# Patient Record
Sex: FEMALE | Race: BLACK OR AFRICAN AMERICAN | Employment: PART TIME | URBAN - METROPOLITAN AREA
[De-identification: names, ages, dates, MRNs, and addresses within clinical notes are randomized per-mention and may not be internally consistent; named-entity substitution may affect disease eponyms.]

---

## 2017-11-30 ENCOUNTER — HOSPITAL ENCOUNTER (EMERGENCY)
Age: 42
Discharge: HOME | End: 2017-11-30
Attending: STUDENT IN AN ORGANIZED HEALTH CARE EDUCATION/TRAINING PROGRAM
Payer: SELF-PAY

## 2017-11-30 VITALS
RESPIRATION RATE: 16 BRPM | HEART RATE: 86 BPM | DIASTOLIC BLOOD PRESSURE: 68 MMHG | HEIGHT: 65 IN | SYSTOLIC BLOOD PRESSURE: 103 MMHG | OXYGEN SATURATION: 95 % | TEMPERATURE: 98.1 F | WEIGHT: 208.38 LBS | BODY MASS INDEX: 34.72 KG/M2
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
## 2017-11-30 PROCEDURE — 99282 EMERGENCY DEPT VISIT SF MDM: CPT
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
## 2018-12-27 ENCOUNTER — HOSPITAL ENCOUNTER (EMERGENCY)
Age: 43
Discharge: LEFT BEFORE DISCHARGE | End: 2018-12-27
Attending: EMERGENCY MEDICINE
Payer: COMMERCIAL

## 2018-12-27 VITALS — HEART RATE: 111 BPM | OXYGEN SATURATION: 98 %

## 2018-12-27 PROCEDURE — 75810000275 HC EMERGENCY DEPT VISIT NO LEVEL OF CARE

## 2019-04-23 ENCOUNTER — APPOINTMENT (OUTPATIENT)
Dept: CT IMAGING | Age: 44
End: 2019-04-23
Attending: EMERGENCY MEDICINE
Payer: COMMERCIAL

## 2019-04-23 ENCOUNTER — HOSPITAL ENCOUNTER (EMERGENCY)
Age: 44
Discharge: HOME | End: 2019-04-23
Attending: EMERGENCY MEDICINE
Payer: COMMERCIAL

## 2019-04-23 VITALS
SYSTOLIC BLOOD PRESSURE: 105 MMHG | TEMPERATURE: 98.8 F | BODY MASS INDEX: 34.68 KG/M2 | HEART RATE: 77 BPM | DIASTOLIC BLOOD PRESSURE: 70 MMHG | HEIGHT: 65 IN | OXYGEN SATURATION: 98 % | RESPIRATION RATE: 15 BRPM

## 2019-04-23 DIAGNOSIS — R10.11 RUQ ABDOMINAL PAIN: Primary | ICD-10-CM

## 2019-04-23 DIAGNOSIS — D64.9 ANEMIA, UNSPECIFIED TYPE: ICD-10-CM

## 2019-04-23 LAB
ALBUMIN SERPL-MCNC: 3.4 G/DL (ref 3.5–5)
ALBUMIN/GLOB SERPL: 0.7 {RATIO} (ref 1.1–2.2)
ALP SERPL-CCNC: 95 U/L (ref 45–117)
ALT SERPL-CCNC: 13 U/L (ref 12–78)
ANION GAP SERPL CALC-SCNC: 4 MMOL/L (ref 5–15)
APPEARANCE UR: CLEAR
AST SERPL-CCNC: 15 U/L (ref 15–37)
BACTERIA URNS QL MICRO: NEGATIVE /HPF
BASOPHILS # BLD: 0 K/UL (ref 0–0.1)
BASOPHILS NFR BLD: 1 % (ref 0–1)
BILIRUB SERPL-MCNC: 0.2 MG/DL (ref 0.2–1)
BILIRUB UR QL: NEGATIVE
BUN SERPL-MCNC: 11 MG/DL (ref 6–20)
BUN/CREAT SERPL: 17 (ref 12–20)
CALCIUM SERPL-MCNC: 9 MG/DL (ref 8.5–10.1)
CHLORIDE SERPL-SCNC: 111 MMOL/L (ref 97–108)
CO2 SERPL-SCNC: 25 MMOL/L (ref 21–32)
COLOR UR: ABNORMAL
COMMENT, HOLDF: NORMAL
CREAT SERPL-MCNC: 0.63 MG/DL (ref 0.55–1.02)
DIFFERENTIAL METHOD BLD: ABNORMAL
EOSINOPHIL # BLD: 0 K/UL (ref 0–0.4)
EOSINOPHIL NFR BLD: 0 % (ref 0–7)
EPITH CASTS URNS QL MICRO: ABNORMAL /LPF
ERYTHROCYTE [DISTWIDTH] IN BLOOD BY AUTOMATED COUNT: 16.1 % (ref 11.5–14.5)
GLOBULIN SER CALC-MCNC: 4.6 G/DL (ref 2–4)
GLUCOSE SERPL-MCNC: 86 MG/DL (ref 65–100)
GLUCOSE UR STRIP.AUTO-MCNC: NEGATIVE MG/DL
HCG UR QL: NEGATIVE
HCT VFR BLD AUTO: 34.7 % (ref 35–47)
HEMOCCULT STL QL: NEGATIVE
HGB BLD-MCNC: 10.5 G/DL (ref 11.5–16)
HGB UR QL STRIP: ABNORMAL
HYALINE CASTS URNS QL MICRO: ABNORMAL /LPF (ref 0–5)
IMM GRANULOCYTES # BLD AUTO: 0 K/UL (ref 0–0.04)
IMM GRANULOCYTES NFR BLD AUTO: 0 % (ref 0–0.5)
KETONES UR QL STRIP.AUTO: NEGATIVE MG/DL
LEUKOCYTE ESTERASE UR QL STRIP.AUTO: NEGATIVE
LIPASE SERPL-CCNC: 109 U/L (ref 73–393)
LYMPHOCYTES # BLD: 1.7 K/UL (ref 0.8–3.5)
LYMPHOCYTES NFR BLD: 33 % (ref 12–49)
MCH RBC QN AUTO: 27.6 PG (ref 26–34)
MCHC RBC AUTO-ENTMCNC: 30.3 G/DL (ref 30–36.5)
MCV RBC AUTO: 91.1 FL (ref 80–99)
MONOCYTES # BLD: 0.4 K/UL (ref 0–1)
MONOCYTES NFR BLD: 8 % (ref 5–13)
NEUTS SEG # BLD: 3 K/UL (ref 1.8–8)
NEUTS SEG NFR BLD: 58 % (ref 32–75)
NITRITE UR QL STRIP.AUTO: NEGATIVE
NRBC # BLD: 0 K/UL (ref 0–0.01)
NRBC BLD-RTO: 0 PER 100 WBC
PH UR STRIP: 6 [PH] (ref 5–8)
PLATELET # BLD AUTO: 292 K/UL (ref 150–400)
PMV BLD AUTO: 10 FL (ref 8.9–12.9)
POTASSIUM SERPL-SCNC: 3.6 MMOL/L (ref 3.5–5.1)
PROT SERPL-MCNC: 8 G/DL (ref 6.4–8.2)
PROT UR STRIP-MCNC: NEGATIVE MG/DL
RBC # BLD AUTO: 3.81 M/UL (ref 3.8–5.2)
RBC #/AREA URNS HPF: ABNORMAL /HPF (ref 0–5)
SAMPLES BEING HELD,HOLD: NORMAL
SODIUM SERPL-SCNC: 140 MMOL/L (ref 136–145)
SP GR UR REFRACTOMETRY: 1.02 (ref 1–1.03)
UR CULT HOLD, URHOLD: NORMAL
UROBILINOGEN UR QL STRIP.AUTO: 1 EU/DL (ref 0.2–1)
WBC # BLD AUTO: 5.1 K/UL (ref 3.6–11)
WBC URNS QL MICRO: ABNORMAL /HPF (ref 0–4)

## 2019-04-23 PROCEDURE — 83690 ASSAY OF LIPASE: CPT

## 2019-04-23 PROCEDURE — 81001 URINALYSIS AUTO W/SCOPE: CPT

## 2019-04-23 PROCEDURE — 82272 OCCULT BLD FECES 1-3 TESTS: CPT

## 2019-04-23 PROCEDURE — 74011636320 HC RX REV CODE- 636/320: Performed by: RADIOLOGY

## 2019-04-23 PROCEDURE — 85025 COMPLETE CBC W/AUTO DIFF WBC: CPT

## 2019-04-23 PROCEDURE — 80053 COMPREHEN METABOLIC PANEL: CPT

## 2019-04-23 PROCEDURE — 74177 CT ABD & PELVIS W/CONTRAST: CPT

## 2019-04-23 PROCEDURE — 99283 EMERGENCY DEPT VISIT LOW MDM: CPT

## 2019-04-23 PROCEDURE — 74011000258 HC RX REV CODE- 258: Performed by: RADIOLOGY

## 2019-04-23 PROCEDURE — 81025 URINE PREGNANCY TEST: CPT

## 2019-04-23 PROCEDURE — 36415 COLL VENOUS BLD VENIPUNCTURE: CPT

## 2019-04-23 RX ORDER — SODIUM CHLORIDE 0.9 % (FLUSH) 0.9 %
10 SYRINGE (ML) INJECTION
Status: DISCONTINUED | OUTPATIENT
Start: 2019-04-23 | End: 2019-04-23 | Stop reason: HOSPADM

## 2019-04-23 RX ADMIN — SODIUM CHLORIDE 200 ML: 900 INJECTION, SOLUTION INTRAVENOUS at 14:38

## 2019-04-23 RX ADMIN — IOPAMIDOL 100 ML: 755 INJECTION, SOLUTION INTRAVENOUS at 14:38

## 2019-04-23 NOTE — ED TRIAGE NOTES
Pt to ED for dark tarry stool and umbilical abdominal pain that began approx 2 months ago, worsening and remaining consistent yesterday. +nausea. Hx of umbilical hernia. Tender to plapation, more on right side.

## 2019-04-23 NOTE — DISCHARGE INSTRUCTIONS
Patient Education      may take tylenol or ibuprofen for pain. Take the ibuprofen with food if you take it. Abdominal Pain: Care Instructions  Your Care Instructions    Abdominal pain has many possible causes. Some aren't serious and get better on their own in a few days. Others need more testing and treatment. If your pain continues or gets worse, you need to be rechecked and may need more tests to find out what is wrong. You may need surgery to correct the problem. Don't ignore new symptoms, such as fever, nausea and vomiting, urination problems, pain that gets worse, and dizziness. These may be signs of a more serious problem. Your doctor may have recommended a follow-up visit in the next 8 to 12 hours. If you are not getting better, you may need more tests or treatment. The doctor has checked you carefully, but problems can develop later. If you notice any problems or new symptoms, get medical treatment right away. Follow-up care is a key part of your treatment and safety. Be sure to make and go to all appointments, and call your doctor if you are having problems. It's also a good idea to know your test results and keep a list of the medicines you take. How can you care for yourself at home? · Rest until you feel better. · To prevent dehydration, drink plenty of fluids, enough so that your urine is light yellow or clear like water. Choose water and other caffeine-free clear liquids until you feel better. If you have kidney, heart, or liver disease and have to limit fluids, talk with your doctor before you increase the amount of fluids you drink. · If your stomach is upset, eat mild foods, such as rice, dry toast or crackers, bananas, and applesauce. Try eating several small meals instead of two or three large ones. · Wait until 48 hours after all symptoms have gone away before you have spicy foods, alcohol, and drinks that contain caffeine. · Do not eat foods that are high in fat.   · Avoid anti-inflammatory medicines such as aspirin, ibuprofen (Advil, Motrin), and naproxen (Aleve). These can cause stomach upset. Talk to your doctor if you take daily aspirin for another health problem. When should you call for help? Call 911 anytime you think you may need emergency care. For example, call if:    · You passed out (lost consciousness).     · You pass maroon or very bloody stools.     · You vomit blood or what looks like coffee grounds.     · You have new, severe belly pain.    Call your doctor now or seek immediate medical care if:    · Your pain gets worse, especially if it becomes focused in one area of your belly.     · You have a new or higher fever.     · Your stools are black and look like tar, or they have streaks of blood.     · You have unexpected vaginal bleeding.     · You have symptoms of a urinary tract infection. These may include:  ? Pain when you urinate. ? Urinating more often than usual.  ? Blood in your urine.     · You are dizzy or lightheaded, or you feel like you may faint.    Watch closely for changes in your health, and be sure to contact your doctor if:    · You are not getting better after 1 day (24 hours). Where can you learn more? Go to http://esperanza-miguelina.info/. Enter R050 in the search box to learn more about \"Abdominal Pain: Care Instructions. \"  Current as of: September 23, 2018  Content Version: 11.9  © 6501-7824 [employer]. Care instructions adapted under license by [employer] (which disclaims liability or warranty for this information). If you have questions about a medical condition or this instruction, always ask your healthcare professional. [place] [address] any warranty or liability for your use of this information. Patient Education        Anemia: Care Instructions  Your Care Instructions    Anemia is a low level of red blood cells, which carry oxygen throughout your body.  Many things can cause anemia. Lack of iron is one of the most common causes. Your body needs iron to make hemoglobin, a substance in red blood cells that carries oxygen from the lungs to your body's cells. Without enough iron, the body produces fewer and smaller red blood cells. As a result, your body's cells do not get enough oxygen, and you feel tired and weak. And you may have trouble concentrating. Bleeding is the most common cause of a lack of iron. You may have heavy menstrual bleeding or bleeding caused by conditions such as ulcers, hemorrhoids, or cancer. Regular use of aspirin or other anti-inflammatory medicines (such as ibuprofen) also can cause bleeding in some people. A lack of iron in your diet also can cause anemia, especially at times when the body needs more iron, such as during pregnancy, infancy, and the teen years. Your doctor may have prescribed iron pills. It may take several months of treatment for your iron levels to return to normal. Your doctor also may suggest that you eat foods that are rich in iron, such as meat and beans. There are many other causes of anemia. It is not always due to a lack of iron. Finding the specific cause of your anemia will help your doctor find the right treatment for you. Follow-up care is a key part of your treatment and safety. Be sure to make and go to all appointments, and call your doctor if you are having problems. It's also a good idea to know your test results and keep a list of the medicines you take. How can you care for yourself at home? · Take your medicines exactly as prescribed. Call your doctor if you think you are having a problem with your medicine. · If your doctor recommends iron pills, take them as directed:  ? Try to take the pills on an empty stomach about 1 hour before or 2 hours after meals. But you may need to take iron with food to avoid an upset stomach.   ? Do not take antacids or drink milk or caffeine drinks (such as coffee, tea, or cola) at the same time or within 2 hours of the time that you take your iron. They can make it hard for your body to absorb the iron. ? Vitamin C (from food or supplements) helps your body absorb iron. Try taking iron pills with a glass of orange juice or some other food that is high in vitamin C, such as citrus fruits. ? Iron pills may cause stomach problems, such as heartburn, nausea, diarrhea, constipation, and cramps. Be sure to drink plenty of fluids, and include fruits, vegetables, and fiber in your diet each day. Iron pills often make your bowel movements dark or green. ? If you forget to take an iron pill, do not take a double dose of iron the next time you take a pill. ? Keep iron pills out of the reach of small children. An overdose of iron can be very dangerous. · Follow your doctor's advice about eating iron-rich foods. These include red meat, shellfish, poultry, eggs, beans, raisins, whole-grain bread, and leafy green vegetables. · Steam vegetables to help them keep their iron content. When should you call for help? Call 911 anytime you think you may need emergency care. For example, call if:    · You have symptoms of a heart attack. These may include:  ? Chest pain or pressure, or a strange feeling in the chest.  ? Sweating. ? Shortness of breath. ? Nausea or vomiting. ? Pain, pressure, or a strange feeling in the back, neck, jaw, or upper belly or in one or both shoulders or arms. ? Lightheadedness or sudden weakness. ? A fast or irregular heartbeat. After you call 911, the  may tell you to chew 1 adult-strength or 2 to 4 low-dose aspirin. Wait for an ambulance.  Do not try to drive yourself.     · You passed out (lost consciousness).    Call your doctor now or seek immediate medical care if:    · You have new or increased shortness of breath.     · You are dizzy or lightheaded, or you feel like you may faint.     · Your fatigue and weakness continue or get worse.     · You have any abnormal bleeding, such as:  ? Nosebleeds. ? Vaginal bleeding that is different (heavier, more frequent, at a different time of the month) than what you are used to.  ? Bloody or black stools, or rectal bleeding. ? Bloody or pink urine.    Watch closely for changes in your health, and be sure to contact your doctor if:    · You do not get better as expected. Where can you learn more? Go to http://esperanza-miguelina.info/. Enter R301 in the search box to learn more about \"Anemia: Care Instructions. \"  Current as of: May 6, 2018  Content Version: 11.9  © 2532-6887 [employer]. Care instructions adapted under license by [employer] (which disclaims liability or warranty for this information). If you have questions about a medical condition or this instruction, always ask your healthcare professional. [address] any warranty or liability for your use of this information.

## 2019-04-23 NOTE — ED PROVIDER NOTES
HPI     36 yo female with with h/o hernia repair here with abd pain periumbilical and move to right. Dark stool yesterday and today. Nausea. No vomiting or diarrhea. Last colonoscopy was 2016 - negative per pt. GI is uknown at [facility]. Surgeon - Dr. Cali Enciso. Denies cp, sob, or any other complaints. No urinary complaints. SHX:  + smoker. +etoh. Past Medical History:   Diagnosis Date    Anemia     Anemia     Anemia     Anemia     Anemia NEC     Blood transfusion     Bronchitis     Community acquired pneumonia     Gastritis     Gastrointestinal disorder     hiatal hernia, gastric ulcers    Hiatal hernia     IBS (irritable bowel syndrome)     Ill-defined condition     Anemia    Migraine     Sinus infection        Past Surgical History:   Procedure Laterality Date    HX HERNIA REPAIR  2013    Nissen fundoplication    HX HERNIA REPAIR  7/24/2014    HX HYSTERECTOMY  2012    HX OTHER SURGICAL      noroplant removed from L arm         Family History:   Problem Relation Age of Onset    Stroke Mother     Lung Disease Father     Bleeding Prob Father     Heart Disease Father     Hypertension Father     Anesth Problems Neg Hx        Social History     Socioeconomic History    Marital status: SINGLE     Spouse name: Not on file    Number of children: Not on file    Years of education: Not on file    Highest education level: Not on file   Occupational History    Not on file   Social Needs    Financial resource strain: Not on file    Food insecurity:     Worry: Not on file     Inability: Not on file    Transportation needs:     Medical: Not on file     Non-medical: Not on file   Tobacco Use    Smoking status: Current Every Day Smoker     Packs/day: 0.50     Years: 10.00     Pack years: 5.00     Types: Cigars    Smokeless tobacco: Never Used    Tobacco comment: SMOKES CIGARS   Substance and Sexual Activity    Alcohol use: Yes     Alcohol/week: 0.0 oz     Comment:  Occ    Drug use: No     Types: Prescription    Sexual activity: Never     Comment: seperated--has 3 children   Lifestyle    Physical activity:     Days per week: Not on file     Minutes per session: Not on file    Stress: Not on file   Relationships    Social connections:     Talks on phone: Not on file     Gets together: Not on file     Attends Tenriism service: Not on file     Active member of club or organization: Not on file     Attends meetings of clubs or organizations: Not on file     Relationship status: Not on file    Intimate partner violence:     Fear of current or ex partner: Not on file     Emotionally abused: Not on file     Physically abused: Not on file     Forced sexual activity: Not on file   Other Topics Concern    Not on file   Social History Narrative    Not on file         ALLERGIES: Patient has no known allergies. Review of Systems   Constitutional: Negative for chills and fever. Respiratory: Negative for shortness of breath. Cardiovascular: Negative for chest pain. Gastrointestinal: Positive for abdominal pain and blood in stool (dark stool). Negative for diarrhea and vomiting. All other systems reviewed and are negative. Vitals:    04/23/19 1118 04/23/19 1206   BP:  105/70   Pulse: 100 77   Resp:  15   Temp:  98.8 °F (37.1 °C)   SpO2: 98% 98%   Height:  5' 5\" (1.651 m)            Physical Exam   Constitutional: She is oriented to person, place, and time. She appears well-developed and well-nourished. HENT:   Head: Normocephalic and atraumatic. Mouth/Throat: Oropharynx is clear and moist. No oropharyngeal exudate. Eyes: Pupils are equal, round, and reactive to light. Conjunctivae are normal. Right eye exhibits no discharge. Left eye exhibits no discharge. No scleral icterus. Neck: Normal range of motion. Neck supple. Cardiovascular: Normal rate, regular rhythm and normal heart sounds. Exam reveals no gallop and no friction rub. No murmur heard.   Pulmonary/Chest: Effort normal and breath sounds normal. No respiratory distress. She has no wheezes. She has no rales. Abdominal: Soft. Bowel sounds are normal. She exhibits no distension. Tenderness: periumbilical and right flank. There is no guarding. Musculoskeletal: Normal range of motion. She exhibits no edema or tenderness. Lymphadenopathy:     She has no cervical adenopathy. Neurological: She is alert and oriented to person, place, and time. No cranial nerve deficit. Coordination normal.   Skin: Skin is warm and dry. No rash noted. Nursing note and vitals reviewed. MDM     abd pain. Dark stool. Check labs and ct abd pelvis. Stool for blood. Procedures      3:32 PM  Micro hematuria and h/o same - f/u pcp. HgB at baseline. Biliary normal on CT.      3:44 PM  Rectal exam completed with RN present. Dark brown stool. No mass. Sent to lab. Olivier Ellis MD    1600  Patient's results have been reviewed with them. Patient and/or family have verbally conveyed their understanding and agreement of the patient's signs, symptoms, diagnosis, treatment and prognosis and additionally agree to follow up as recommended or return to the Emergency Room should their condition change prior to follow-up. Discharge instructions have also been provided to the patient with some educational information regarding their diagnosis as well a list of reasons why they would want to return to the ER prior to their follow-up appointment should their condition change.          Recent Results (from the past 24 hour(s))   CBC WITH AUTOMATED DIFF    Collection Time: 04/23/19 11:47 AM   Result Value Ref Range    WBC 5.1 3.6 - 11.0 K/uL    RBC 3.81 3.80 - 5.20 M/uL    HGB 10.5 (L) 11.5 - 16.0 g/dL    HCT 34.7 (L) 35.0 - 47.0 %    MCV 91.1 80.0 - 99.0 FL    MCH 27.6 26.0 - 34.0 PG    MCHC 30.3 30.0 - 36.5 g/dL    RDW 16.1 (H) 11.5 - 14.5 %    PLATELET 800 324 - 461 K/uL    MPV 10.0 8.9 - 12.9 FL    NRBC 0.0 0  WBC    ABSOLUTE NRBC 0.00 0.00 - 0.01 K/uL    NEUTROPHILS 58 32 - 75 %    LYMPHOCYTES 33 12 - 49 %    MONOCYTES 8 5 - 13 %    EOSINOPHILS 0 0 - 7 %    BASOPHILS 1 0 - 1 %    IMMATURE GRANULOCYTES 0 0.0 - 0.5 %    ABS. NEUTROPHILS 3.0 1.8 - 8.0 K/UL    ABS. LYMPHOCYTES 1.7 0.8 - 3.5 K/UL    ABS. MONOCYTES 0.4 0.0 - 1.0 K/UL    ABS. EOSINOPHILS 0.0 0.0 - 0.4 K/UL    ABS. BASOPHILS 0.0 0.0 - 0.1 K/UL    ABS. IMM. GRANS. 0.0 0.00 - 0.04 K/UL    DF AUTOMATED     METABOLIC PANEL, COMPREHENSIVE    Collection Time: 04/23/19 11:47 AM   Result Value Ref Range    Sodium 140 136 - 145 mmol/L    Potassium 3.6 3.5 - 5.1 mmol/L    Chloride 111 (H) 97 - 108 mmol/L    CO2 25 21 - 32 mmol/L    Anion gap 4 (L) 5 - 15 mmol/L    Glucose 86 65 - 100 mg/dL    BUN 11 6 - 20 MG/DL    Creatinine 0.63 0.55 - 1.02 MG/DL    BUN/Creatinine ratio 17 12 - 20      GFR est AA >60 >60 ml/min/1.73m2    GFR est non-AA >60 >60 ml/min/1.73m2    Calcium 9.0 8.5 - 10.1 MG/DL    Bilirubin, total 0.2 0.2 - 1.0 MG/DL    ALT (SGPT) 13 12 - 78 U/L    AST (SGOT) 15 15 - 37 U/L    Alk.  phosphatase 95 45 - 117 U/L    Protein, total 8.0 6.4 - 8.2 g/dL    Albumin 3.4 (L) 3.5 - 5.0 g/dL    Globulin 4.6 (H) 2.0 - 4.0 g/dL    A-G Ratio 0.7 (L) 1.1 - 2.2     URINALYSIS W/MICROSCOPIC    Collection Time: 04/23/19 11:47 AM   Result Value Ref Range    Color YELLOW/STRAW      Appearance CLEAR CLEAR      Specific gravity 1.022 1.003 - 1.030      pH (UA) 6.0 5.0 - 8.0      Protein NEGATIVE  NEG mg/dL    Glucose NEGATIVE  NEG mg/dL    Ketone NEGATIVE  NEG mg/dL    Bilirubin NEGATIVE  NEG      Blood MODERATE (A) NEG      Urobilinogen 1.0 0.2 - 1.0 EU/dL    Nitrites NEGATIVE  NEG      Leukocyte Esterase NEGATIVE  NEG      WBC 0-4 0 - 4 /hpf    RBC 10-20 0 - 5 /hpf    Epithelial cells FEW FEW /lpf    Bacteria NEGATIVE  NEG /hpf    Hyaline cast 0-2 0 - 5 /lpf   URINE CULTURE HOLD SAMPLE    Collection Time: 04/23/19 11:47 AM   Result Value Ref Range    Urine culture hold        URINE ON HOLD IN MICROBIOLOGY DEPT FOR 3 DAYS. IF UNPRESERVED URINE IS SUBMITTED, IT CANNOT BE USED FOR ADDITIONAL TESTING AFTER 24 HRS, RECOLLECTION WILL BE REQUIRED. SAMPLES BEING HELD    Collection Time: 04/23/19 11:47 AM   Result Value Ref Range    SAMPLES BEING HELD 1RED 1BLU     COMMENT        Add-on orders for these samples will be processed based on acceptable specimen integrity and analyte stability, which may vary by analyte. HCG URINE, QL. - POC    Collection Time: 04/23/19 11:47 AM   Result Value Ref Range    Pregnancy test,urine (POC) NEGATIVE  NEG     LIPASE    Collection Time: 04/23/19 11:47 AM   Result Value Ref Range    Lipase 109 73 - 393 U/L   OCCULT BLOOD, STOOL    Collection Time: 04/23/19  3:44 PM   Result Value Ref Range    Occult blood, stool NEGATIVE  NEG         Ct Abd Pelv W Cont    Result Date: 4/23/2019  EXAM: CT ABD PELV W CONT INDICATION: dark stool. right sided and periumbilical abd pain for 2 months . History of umbilical hernia. COMPARISON: 12/2/2015 CONTRAST: 100 mL of Isovue-370. TECHNIQUE: Following the uneventful intravenous administration of contrast, thin axial images were obtained through the abdomen and pelvis. Coronal and sagittal reconstructions were generated. Oral contrast was not administered. CT dose reduction was achieved through use of a standardized protocol tailored for this examination and automatic exposure control for dose modulation. FINDINGS: LUNG BASES: Bibasilar atelectasis. INCIDENTALLY IMAGED HEART AND MEDIASTINUM: Unremarkable. LIVER: No mass or biliary dilatation. GALLBLADDER: Unremarkable. SPLEEN: No mass. PANCREAS: No mass or ductal dilatation. ADRENALS: Unremarkable. KIDNEYS: No mass, calculus, or hydronephrosis. STOMACH: Surgical clips at the GE junction consistent with the clinical history of Nissen fundoplication.  The proximal stomach is thick-walled and contains surgical clips, with a large amount of intraluminal debris, similar to the prior examination, and probable gastrojejunostomy. SMALL BOWEL: No dilatation or wall thickening. COLON: No dilatation or wall thickening. APPENDIX: Unremarkable. PERITONEUM: No ascites or pneumoperitoneum. RETROPERITONEUM: No lymphadenopathy or aortic aneurysm. REPRODUCTIVE ORGANS: Surgically absent uterus. URINARY BLADDER: No mass or calculus. BONES: No destructive bone lesion. ADDITIONAL COMMENTS: Periumbilical hernia containing fat, just to the right of midline, relatively stable. IMPRESSION: 1. No acute abdominal or pelvic abnormality. 2. Postoperative changes with mild gastric wall thickening. Correlate with surgical and clinical history. There is no obvious hemorrhage or mass. 3. Periumbilical hernia,, stable. 4. Other incidental and postoperative changes as described.

## 2022-05-02 PROBLEM — M75.41 IMPINGEMENT SYNDROME OF SHOULDER, RIGHT: Status: ACTIVE | Noted: 2022-05-02

## 2022-05-02 NOTE — PROGRESS NOTES
ASSESSMENT/PLAN:  Below is the assessment and plan developed based on review of pertinent history, physical exam, labs, studies, and medications. 1. Impingement syndrome of shoulder, right  -     diclofenac sodium 1.5 % drop; 40 Drops by Apply Externally route four (4) times daily as needed for PRN Reason (Other). , Normal, Disp-150 mL, R-0  -     lidocaine (LIDODERM) 5 %; Apply patch to the affected area for 12 hours a day and remove for 12 hours a day., Normal, Disp-30 Each, R-2  -     lidocaine (XYLOCAINE) 5 % ointment; Apply to affected area twice daily as needed for pain, Normal, Disp-100 g, R-2      Return in about 3 months (around 8/3/2022). In discussion with the patient, we considered the numerus possible diagnoses that could be contributing to their present symptoms. We also deliberated on the extensive management options that must be considered to treat their current condition. We reviewed their accessible prior medical records, diagnostic tests, and current health and employment information. We considered how these symptoms were affecting the patient´s activities of daily living as well as employment and fitness activities. The patient had various questions regarding the possible risks, benefits, complications, morbidity and mortality regarding their diagnosis and treatment options. The patients´ comorbidities were considered, and I advocated that they consider maximizing lifestyle modification through nutrition and exercise to aid in addressing their symptoms. Shared decision making yielded an understanding to move forward with conservation treatment preferences. The patient expressed understanding that if conservative management fails to alleviate the present symptoms they will return to office for re-evaluation and consideration of additional diagnostic tests and potential surgical options.      In the interim, we have recommended ice, elevation, and take prescription anti-inflammatory medications along with a physician directed home exercise program. We discussed the risks and common side effects of anti-inflammatory medications and instructed the patient to discontinue the medication and contact us if they experienced any side effects. The patient was encouraged to discuss the possible side effects with their family physician or pharmacist prior to initiating any new medications. We discussed the fact that many of the recommended treatment options presented are significantly limited by the patient´s social determinants of health. We also reviewed the circumstances surrounding the environment that they live and work which affect a wide range of health risk. We considered the limited access to appropriate educational resources regarding proper nutrition and exercise as well as the economic and social support necessary to maintain health and wellbeing. After a long discussion regarding treatment options, we have decided to prescribe an oral medication. We discussed the risks and common side effects of the medication and instructed the patient to discontinue the medication and contact us if they experienced any side effects. We also encouraged the patient to discuss the possible side effects with their family physician or pharmacist prior to initiating any new medications. Will continue on home exercise program.  Should be out of work at this time. I am happy to see her back in 6 to 8 weeks to evaluate her progress. We talked about the fact that she may want to see our hand specialist for index finger. We will continue her on diclofenac cream, lidocaine cream as well as lidocaine patches to help. She will be following up soon with a hernia specialist.    SUBJECTIVE/OBJECTIVE:  Rafael Vera (: 1975) is a 48 y.o. female, patient,here for evaluation of the Shoulder Pain (right shoulder)  . Of note, the patient was last seen for right shoulder on 2021.   She originally injured her shoulder at work. She underwent extensive physical therapy as well as injections and anti-inflammatory medications. She had an MRI 4/6/2021 that did show some bursal fraying of the rotator cuff but not a full-thickness tear. At her last visit, we talked about the possibility of an arthroscopy with bursectomy, acromioplasty and distal clavicle excision. She follows up today. Reports she has had an extensive history with recent hernia surgery that required multiple hospitalizations. She reports that she still having discomfort with her shoulder. She reports she recently had problems with her index finger and compensating for her right shoulder. Physical Exam    Upon physical examination, the patient is well developed, well nourished, alert and oriented times three, with normal mood and affect and walks with a normal gait. Upon examination of the right shoulder, the patient sits with the scapula protracted and depressed. They are tender to palpation over the anterior supraspinatus and proximal biceps. They also are tender to palpation over the acromioclavicular joint and have discomfort with cross adduction testing. There is mild palpable crepitus in the subacromial space with ranging. The patient has limited range of motion, and discomfort with above shoulder range of motion. The patient has discomfort with Neer and Elder impingement maneuvers and Whipple testing. The shoulder is stable on exam. They have 5/5 strength with internal and external rotation and are neurovascularly intact distally. There is no erythema, warmth or skin lesions present. On examination of the contralateral extremity, the patient is nontender to palpation and has excellent range of motion, stability and strength. Imaging:    I have reviewed the patient´s previous diagnostic tests in an effort to support the diagnosis and treatment options.     Allergies   Allergen Reactions    Gabapentin Unknown (comments) Current Outpatient Medications   Medication Sig    Pain Reliever Extra Strength 500 mg tablet     ALPRAZolam (XANAX) 0.25 mg tablet     escitalopram oxalate (LEXAPRO) 10 mg tablet Take 10 mg by mouth daily.  diclofenac sodium 1.5 % drop 40 Drops by Apply Externally route four (4) times daily as needed for PRN Reason (Other).  lidocaine (LIDODERM) 5 % Apply patch to the affected area for 12 hours a day and remove for 12 hours a day.  lidocaine (XYLOCAINE) 5 % ointment Apply to affected area twice daily as needed for pain     No current facility-administered medications for this visit.        Past Medical History:   Diagnosis Date    Anemia     Anemia     Anemia     Anemia     Anemia NEC     Blood transfusion     Bronchitis     Community acquired pneumonia     Gastritis     Gastrointestinal disorder     hiatal hernia, gastric ulcers    Hiatal hernia     IBS (irritable bowel syndrome)     Ill-defined condition     Anemia    Migraine     Sinus infection        Past Surgical History:   Procedure Laterality Date    HX HERNIA REPAIR  2013    Nissen fundoplication    HX HERNIA REPAIR  7/24/2014    HX HYSTERECTOMY  2012    HX KNEE ARTHROSCOPY      HX OTHER SURGICAL      noroplant removed from L arm       Family History   Problem Relation Age of Onset    Stroke Mother     Lung Disease Father     Bleeding Prob Father     Heart Disease Father     Hypertension Father     Anesth Problems Neg Hx        Social History     Socioeconomic History    Marital status: SINGLE     Spouse name: Not on file    Number of children: Not on file    Years of education: Not on file    Highest education level: Not on file   Occupational History    Not on file   Tobacco Use    Smoking status: Former Smoker     Packs/day: 0.50     Years: 10.00     Pack years: 5.00     Types: Cigars    Smokeless tobacco: Never Used    Tobacco comment: SMOKES CIGARS   Vaping Use    Vaping Use: Some days    Substances: Flavoring   Substance and Sexual Activity    Alcohol use: Yes     Alcohol/week: 0.0 standard drinks     Comment: Occ    Drug use: No     Types: Prescription    Sexual activity: Never     Comment: seperated--has 3 children   Other Topics Concern    Not on file   Social History Narrative    Not on file     Social Determinants of Health     Financial Resource Strain:     Difficulty of Paying Living Expenses: Not on file   Food Insecurity:     Worried About Running Out of Food in the Last Year: Not on file    Theresa of Food in the Last Year: Not on file   Transportation Needs:     Lack of Transportation (Medical): Not on file    Lack of Transportation (Non-Medical): Not on file   Physical Activity:     Days of Exercise per Week: Not on file    Minutes of Exercise per Session: Not on file   Stress:     Feeling of Stress : Not on file   Social Connections:     Frequency of Communication with Friends and Family: Not on file    Frequency of Social Gatherings with Friends and Family: Not on file    Attends Cheondoism Services: Not on file    Active Member of [address] [place] [address] or Organizations: Not on file    Attends Club or Organization Meetings: Not on file    Marital Status: Not on file   Intimate Partner Violence:     Fear of Current or Ex-Partner: Not on file    Emotionally Abused: Not on file    Physically Abused: Not on file    Sexually Abused: Not on file   Housing Stability:     Unable to Pay for Housing in the Last Year: Not on file    Number of [place] in the Last Year: Not on file    Unstable Housing in the Last Year: Not on file       Review of Systems    No flowsheet data found. Vitals:  Ht 5' 5\" (1.651 m)   Wt 190 lb (86.2 kg)   LMP 01/04/2012   BMI 31.62 kg/m²    Body mass index is 31.62 kg/m². An electronic signature was used to authenticate this note.   -- Michelle Deal MD

## 2022-05-03 ENCOUNTER — OFFICE VISIT (OUTPATIENT)
Dept: ORTHOPEDIC SURGERY | Age: 47
End: 2022-05-03
Payer: OTHER MISCELLANEOUS

## 2022-05-03 VITALS — HEIGHT: 65 IN | BODY MASS INDEX: 31.65 KG/M2 | WEIGHT: 190 LBS

## 2022-05-03 DIAGNOSIS — M75.41 IMPINGEMENT SYNDROME OF SHOULDER, RIGHT: Primary | ICD-10-CM

## 2022-05-03 PROCEDURE — 99214 OFFICE O/P EST MOD 30 MIN: CPT | Performed by: ORTHOPAEDIC SURGERY

## 2022-05-03 RX ORDER — BENZOIN
TINCTURE TOPICAL
COMMUNITY
Start: 2022-03-05 | End: 2022-09-25 | Stop reason: ALTCHOICE

## 2022-05-03 RX ORDER — ALPRAZOLAM 0.25 MG/1
TABLET ORAL
COMMUNITY
Start: 2022-03-09 | End: 2022-09-27

## 2022-05-03 RX ORDER — DICLOFENAC SODIUM 16.05 MG/ML
40 SOLUTION TOPICAL
Qty: 150 ML | Refills: 0 | Status: SHIPPED | OUTPATIENT
Start: 2022-05-03 | End: 2022-09-27

## 2022-05-03 RX ORDER — ESCITALOPRAM OXALATE 10 MG/1
10 TABLET ORAL DAILY
COMMUNITY
Start: 2022-04-07

## 2022-05-03 RX ORDER — LIDOCAINE 50 MG/G
OINTMENT TOPICAL
Qty: 100 G | Refills: 2 | Status: SHIPPED | OUTPATIENT
Start: 2022-05-03

## 2022-05-03 RX ORDER — LIDOCAINE 50 MG/G
PATCH TOPICAL
Qty: 30 EACH | Refills: 2 | Status: SHIPPED | OUTPATIENT
Start: 2022-05-03

## 2022-05-03 NOTE — LETTER
NOTIFICATION RETURN TO WORK / SCHOOL    5/3/2022 11:38 AM    Ms. Lamar Damon  [address] [facility] [address] [address]      To Whom It May Concern:    Lamar Damon is currently under the care of [facility]. She will be out of work at this point for her right shoulder. I am happy to see her back in 6 to 8 weeks to evaluate her progress. If there are questions or concerns please have the patient contact our office.         Sincerely,      Red Powell MD

## 2022-09-08 NOTE — PROGRESS NOTES
ASSESSMENT/PLAN:  Below is the assessment and plan developed based on review of pertinent history, physical exam, labs, studies, and medications. 1. Impingement syndrome of shoulder, right  -     diclofenac sodium 1.5 % drop; 40 Drops by Apply Externally route four (4) times daily as needed for PRN Reason (Other). , Normal, Disp-150 mL, R-0  -     lidocaine (LIDODERM) 5 %; Apply patch to the affected area for 12 hours a day and remove for 12 hours a day., Normal, Disp-30 Each, R-2  -     lidocaine (XYLOCAINE) 5 % ointment; Apply to affected area twice daily as needed for pain, Normal, Disp-100 g, R-2      Return in about 3 months (around 8/3/2022). We discussed the treatment options for the patient´s diagnosis, which included: living with the extremity as it is, organized exercises, medicines, injections, and surgical options. Utilizing shared treatment decision-making; we also discussed the nature and purpose of the treatment options along with the expected risks and benefits. The patient has expressed a desire to proceed with surgery, and I think that is a reasonable option. I educated the patient regarding the inherent and unavoidable risks which include, but are not limited to anesthesia, infection, damage to nerves and blood vessels, blood loss, blood clots, and even death were discussed at length. We also talked about the possibility of not being able to return to prior activities or employment, the need for future surgery, and complex regional pain syndrome. The patient expressed understanding of these risks and has elected to proceed with surgery. Ample time was given for questions, of which many were addressed. We have discussed the surgical procedure as well as the realistic expectations regarding the risks, outcome and post-operative protocol. We will set this up when it is convenient for the patient.  We have instructed them to contact us if there are any questions or concerns between now and their date of surgery. We talked about procedure as well as postoperative protocol in detail. She is can look at her calendar and call us to schedule right shoulder arthroscopy with acromioplasty, distal clavicle excision, extensive debridement and possible rotator cuff repair. She understands that she would be out of work postoperatively and be attending physical therapy regularly. Her  accompanied her today in the office. SUBJECTIVE/OBJECTIVE:  Negrito Jones (: 1975) is a 48 y.o. female, patient,here for evaluation of the Shoulder Pain (right shoulder)  . Of note, the patient was last seen for right shoulder on 2021. She originally injured her shoulder at work. She underwent extensive physical therapy as well as injections and anti-inflammatory medications. She had an MRI 2021 that did show some bursal fraying of the rotator cuff but not a full-thickness tear. At her last visit, we talked about the possibility of an arthroscopy with bursectomy, acromioplasty and distal clavicle excision. She follows up today. Reports she has had an extensive history with recent hernia surgery that required multiple hospitalizations. She reports that she still having discomfort with her shoulder. She reports she recently had problems with her index finger and compensating for her right shoulder. Physical Exam    Upon physical examination, the patient is well developed, well nourished, alert and oriented times three, with normal mood and affect and walks with a normal gait. Upon examination of the right shoulder, the patient sits with the scapula protracted and depressed. They are tender to palpation over the anterior supraspinatus and proximal biceps. They also are tender to palpation over the acromioclavicular joint and have discomfort with cross adduction testing. There is mild palpable crepitus in the subacromial space with ranging.  The patient has limited range of motion, and discomfort with above shoulder range of motion. The patient has discomfort with Neer and Elder impingement maneuvers and Whipple testing. The shoulder is stable on exam. They have 5/5 strength with internal and external rotation and are neurovascularly intact distally. There is no erythema, warmth or skin lesions present. On examination of the contralateral extremity, the patient is nontender to palpation and has excellent range of motion, stability and strength. Imaging:    I have reviewed the patient´s previous diagnostic tests in an effort to support the diagnosis and treatment options. Allergies   Allergen Reactions    Gabapentin Unknown (comments)       Current Outpatient Medications   Medication Sig    Pain Reliever Extra Strength 500 mg tablet     ALPRAZolam (XANAX) 0.25 mg tablet     escitalopram oxalate (LEXAPRO) 10 mg tablet Take 10 mg by mouth daily. diclofenac sodium 1.5 % drop 40 Drops by Apply Externally route four (4) times daily as needed for PRN Reason (Other). lidocaine (LIDODERM) 5 % Apply patch to the affected area for 12 hours a day and remove for 12 hours a day. lidocaine (XYLOCAINE) 5 % ointment Apply to affected area twice daily as needed for pain     No current facility-administered medications for this visit.        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       Family History   Problem Relation Age of Onset Stroke Mother     Lung Disease Father     Bleeding Prob Father     Heart Disease Father     Hypertension Father     Anesth Problems Neg Hx        Social History     Socioeconomic History    Marital status: SINGLE     Spouse name: Not on file    Number of children: Not on file    Years of education: Not on file    Highest education level: Not on file   Occupational History    Not on file   Tobacco Use    Smoking status: Former Smoker     Packs/day: 0.50     Years: 10.00     Pack years: 5.00     Types: Cigars    Smokeless tobacco: Never Used    Tobacco comment: SMOKES CIGARS   Vaping Use    Vaping Use: Some days    Substances: Flavoring   Substance and Sexual Activity    Alcohol use: Yes     Alcohol/week: 0.0 standard drinks     Comment: Occ    Drug use: No     Types: Prescription    Sexual activity: Never     Comment: seperated--has 3 children   Other Topics Concern    Not on file   Social History Narrative    Not on file     Social Determinants of Health     Financial Resource Strain:     Difficulty of Paying Living Expenses: Not on file   Food Insecurity:     Worried About [address] in the Last Year: Not on file    Ran Out of Food in the Last Year: Not on file   Transportation Needs:     Lack of Transportation (Medical): Not on file    Lack of Transportation (Non-Medical):  Not on file   Physical Activity:     Days of Exercise per Week: Not on file    Minutes of Exercise per Session: Not on file   Stress:     Feeling of Stress : Not on file   Social Connections:     Frequency of Communication with Friends and Family: Not on file    Frequency of Social Gatherings with Friends and Family: Not on file    Attends Confucianist Services: Not on file    Active Member of Clubs or Organizations: Not on file    Attends Club or Organization Meetings: Not on file    Marital Status: Not on file   Intimate Partner Violence:     Fear of Current or Ex-Partner: Not on file    Emotionally Abused: Not on file    Physically Abused: Not on file    Sexually Abused: Not on file   Housing Stability:     Unable to Pay for Housing in the Last Year: Not on file    Number of Places Lived in the Last Year: Not on file    Unstable Housing in the Last Year: Not on file       Review of Systems    No flowsheet data found. Vitals:  Ht 5' 5\" (1.651 m)   Wt 190 lb (86.2 kg)   LMP 01/04/2012   BMI 31.62 kg/m²    Body mass index is 31.62 kg/m². An electronic signature was used to authenticate this note.   -- Dion Engel MD

## 2022-09-09 ENCOUNTER — OFFICE VISIT (OUTPATIENT)
Dept: ORTHOPEDIC SURGERY | Age: 47
End: 2022-09-09
Payer: OTHER MISCELLANEOUS

## 2022-09-09 DIAGNOSIS — M75.41 IMPINGEMENT SYNDROME OF SHOULDER, RIGHT: Primary | ICD-10-CM

## 2022-09-09 PROCEDURE — 99214 OFFICE O/P EST MOD 30 MIN: CPT | Performed by: ORTHOPAEDIC SURGERY

## 2022-09-09 NOTE — LETTER
NOTIFICATION RETURN TO WORK / SCHOOL    9/9/2022 11:01 AM    Ms. Christine James  [id number]      To Whom It May Concern:    Krysta Mendez is currently under the care of [facility]. Please allow her to be out of work until surgery on her right shoulder scheduled. If there are questions or concerns please have the patient contact our office.         Sincerely,      Jonathon Ortiz MD

## 2022-09-16 DIAGNOSIS — M75.41 IMPINGEMENT SYNDROME OF SHOULDER, RIGHT: Primary | ICD-10-CM

## 2022-09-16 DIAGNOSIS — M75.101 ROTATOR CUFF SYNDROME OF RIGHT SHOULDER: ICD-10-CM

## 2022-09-25 DIAGNOSIS — M75.41 IMPINGEMENT SYNDROME OF SHOULDER, RIGHT: Primary | ICD-10-CM

## 2022-09-25 RX ORDER — OXYCODONE AND ACETAMINOPHEN 5; 325 MG/1; MG/1
1 TABLET ORAL
Qty: 42 TABLET | Refills: 0 | Status: SHIPPED | OUTPATIENT
Start: 2022-09-25 | End: 2022-09-27

## 2022-09-27 ENCOUNTER — APPOINTMENT (OUTPATIENT)
Dept: CT IMAGING | Age: 47
DRG: 917 | End: 2022-09-27
Attending: EMERGENCY MEDICINE
Payer: COMMERCIAL

## 2022-09-27 ENCOUNTER — TELEPHONE (OUTPATIENT)
Dept: ORTHOPEDIC SURGERY | Age: 47
End: 2022-09-27

## 2022-09-27 ENCOUNTER — HOSPITAL ENCOUNTER (INPATIENT)
Age: 47
LOS: 3 days | Discharge: HOME | DRG: 917 | End: 2022-09-30
Attending: EMERGENCY MEDICINE | Admitting: HOSPITALIST
Payer: COMMERCIAL

## 2022-09-27 DIAGNOSIS — J18.9 PNEUMONIA OF RIGHT LOWER LOBE DUE TO INFECTIOUS ORGANISM: ICD-10-CM

## 2022-09-27 DIAGNOSIS — J98.6 ELEVATED HEMIDIAPHRAGM: ICD-10-CM

## 2022-09-27 DIAGNOSIS — R06.02 SOB (SHORTNESS OF BREATH): ICD-10-CM

## 2022-09-27 DIAGNOSIS — R09.02 HYPOXIA: Primary | ICD-10-CM

## 2022-09-27 PROBLEM — J96.01 ACUTE RESPIRATORY FAILURE WITH HYPOXIA (HCC): Status: ACTIVE | Noted: 2022-09-27

## 2022-09-27 LAB
ALBUMIN SERPL-MCNC: 3.1 G/DL (ref 3.5–5)
ALBUMIN/GLOB SERPL: 0.6 {RATIO} (ref 1.1–2.2)
ALP SERPL-CCNC: 103 U/L (ref 45–117)
ALT SERPL-CCNC: 43 U/L (ref 12–78)
ANION GAP SERPL CALC-SCNC: 4 MMOL/L (ref 5–15)
ARTERIAL PATENCY WRIST A: POSITIVE
AST SERPL-CCNC: 61 U/L (ref 15–37)
ATRIAL RATE: 107 BPM
BASE EXCESS BLD CALC-SCNC: 0.8 MMOL/L
BASOPHILS # BLD: 0 K/UL (ref 0–0.1)
BASOPHILS NFR BLD: 0 % (ref 0–1)
BDY SITE: ABNORMAL
BILIRUB SERPL-MCNC: 0.2 MG/DL (ref 0.2–1)
BNP SERPL-MCNC: 356 PG/ML
BUN SERPL-MCNC: 14 MG/DL (ref 6–20)
BUN/CREAT SERPL: 18 (ref 12–20)
CALCIUM SERPL-MCNC: 9.3 MG/DL (ref 8.5–10.1)
CALCULATED P AXIS, ECG09: 57 DEGREES
CALCULATED R AXIS, ECG10: 15 DEGREES
CALCULATED T AXIS, ECG11: 19 DEGREES
CHLORIDE SERPL-SCNC: 107 MMOL/L (ref 97–108)
CO2 SERPL-SCNC: 27 MMOL/L (ref 21–32)
COMMENT, HOLDF: NORMAL
COVID-19 RAPID TEST, COVR: NOT DETECTED
CREAT SERPL-MCNC: 0.78 MG/DL (ref 0.55–1.02)
DIAGNOSIS, 93000: NORMAL
DIFFERENTIAL METHOD BLD: ABNORMAL
EOSINOPHIL # BLD: 0 K/UL (ref 0–0.4)
EOSINOPHIL NFR BLD: 0 % (ref 0–7)
ERYTHROCYTE [DISTWIDTH] IN BLOOD BY AUTOMATED COUNT: 15.9 % (ref 11.5–14.5)
GAS FLOW.O2 O2 DELIVERY SYS: ABNORMAL L/MIN
GAS FLOW.O2 SETTING OXYMISER: 22 BPM
GLOBULIN SER CALC-MCNC: 5.1 G/DL (ref 2–4)
GLUCOSE SERPL-MCNC: 112 MG/DL (ref 65–100)
HCO3 BLD-SCNC: 26.6 MMOL/L (ref 22–26)
HCT VFR BLD AUTO: 34.6 % (ref 35–47)
HGB BLD-MCNC: 10.7 G/DL (ref 11.5–16)
IMM GRANULOCYTES # BLD AUTO: 0.1 K/UL (ref 0–0.04)
IMM GRANULOCYTES NFR BLD AUTO: 1 % (ref 0–0.5)
LYMPHOCYTES # BLD: 1.2 K/UL (ref 0.8–3.5)
LYMPHOCYTES NFR BLD: 11 % (ref 12–49)
MCH RBC QN AUTO: 27.3 PG (ref 26–34)
MCHC RBC AUTO-ENTMCNC: 30.9 G/DL (ref 30–36.5)
MCV RBC AUTO: 88.3 FL (ref 80–99)
MONOCYTES # BLD: 0.6 K/UL (ref 0–1)
MONOCYTES NFR BLD: 5 % (ref 5–13)
NEUTS SEG # BLD: 9.2 K/UL (ref 1.8–8)
NEUTS SEG NFR BLD: 83 % (ref 32–75)
NRBC # BLD: 0 K/UL (ref 0–0.01)
NRBC BLD-RTO: 0 PER 100 WBC
O2/TOTAL GAS SETTING VFR VENT: 36 %
P-R INTERVAL, ECG05: 134 MS
PCO2 BLD: 46.6 MMHG (ref 35–45)
PH BLD: 7.36 [PH] (ref 7.35–7.45)
PLATELET # BLD AUTO: 474 K/UL (ref 150–400)
PMV BLD AUTO: 10.6 FL (ref 8.9–12.9)
PO2 BLD: 67 MMHG (ref 80–100)
POTASSIUM SERPL-SCNC: 4 MMOL/L (ref 3.5–5.1)
PROT SERPL-MCNC: 8.2 G/DL (ref 6.4–8.2)
Q-T INTERVAL, ECG07: 336 MS
QRS DURATION, ECG06: 82 MS
QTC CALCULATION (BEZET), ECG08: 448 MS
RBC # BLD AUTO: 3.92 M/UL (ref 3.8–5.2)
SAMPLES BEING HELD,HOLD: NORMAL
SAO2 % BLD: 92.2 % (ref 92–97)
SODIUM SERPL-SCNC: 138 MMOL/L (ref 136–145)
SOURCE, COVRS: NORMAL
SPECIMEN TYPE: ABNORMAL
TROPONIN-HIGH SENSITIVITY: <4 NG/L (ref 0–51)
VENTRICULAR RATE, ECG03: 107 BPM
WBC # BLD AUTO: 11 K/UL (ref 3.6–11)

## 2022-09-27 PROCEDURE — 80053 COMPREHEN METABOLIC PANEL: CPT

## 2022-09-27 PROCEDURE — 71275 CT ANGIOGRAPHY CHEST: CPT

## 2022-09-27 PROCEDURE — 36415 COLL VENOUS BLD VENIPUNCTURE: CPT

## 2022-09-27 PROCEDURE — 99285 EMERGENCY DEPT VISIT HI MDM: CPT

## 2022-09-27 PROCEDURE — 83880 ASSAY OF NATRIURETIC PEPTIDE: CPT

## 2022-09-27 PROCEDURE — 82803 BLOOD GASES ANY COMBINATION: CPT

## 2022-09-27 PROCEDURE — 85025 COMPLETE CBC W/AUTO DIFF WBC: CPT

## 2022-09-27 PROCEDURE — 36600 WITHDRAWAL OF ARTERIAL BLOOD: CPT

## 2022-09-27 PROCEDURE — 65660000001 HC RM ICU INTERMED STEPDOWN

## 2022-09-27 PROCEDURE — 84484 ASSAY OF TROPONIN QUANT: CPT

## 2022-09-27 PROCEDURE — 93005 ELECTROCARDIOGRAM TRACING: CPT

## 2022-09-27 PROCEDURE — 96374 THER/PROPH/DIAG INJ IV PUSH: CPT

## 2022-09-27 PROCEDURE — 74011000258 HC RX REV CODE- 258: Performed by: EMERGENCY MEDICINE

## 2022-09-27 PROCEDURE — 74011000636 HC RX REV CODE- 636: Performed by: EMERGENCY MEDICINE

## 2022-09-27 PROCEDURE — 74011250636 HC RX REV CODE- 250/636: Performed by: EMERGENCY MEDICINE

## 2022-09-27 PROCEDURE — 74011250637 HC RX REV CODE- 250/637: Performed by: HOSPITALIST

## 2022-09-27 PROCEDURE — 87635 SARS-COV-2 COVID-19 AMP PRB: CPT

## 2022-09-27 RX ORDER — NALOXONE HYDROCHLORIDE 0.4 MG/ML
0.4 INJECTION, SOLUTION INTRAMUSCULAR; INTRAVENOUS; SUBCUTANEOUS
Status: DISCONTINUED | OUTPATIENT
Start: 2022-09-27 | End: 2022-09-30 | Stop reason: HOSPADM

## 2022-09-27 RX ORDER — ALPRAZOLAM 0.25 MG/1
0.25 TABLET ORAL
Status: DISCONTINUED | OUTPATIENT
Start: 2022-09-27 | End: 2022-09-30 | Stop reason: HOSPADM

## 2022-09-27 RX ORDER — OXYCODONE AND ACETAMINOPHEN 7.5; 325 MG/1; MG/1
1 TABLET ORAL
Status: DISCONTINUED | OUTPATIENT
Start: 2022-09-27 | End: 2022-09-30 | Stop reason: HOSPADM

## 2022-09-27 RX ORDER — HYDROCODONE BITARTRATE AND ACETAMINOPHEN 7.5; 325 MG/1; MG/1
1 TABLET ORAL EVERY 8 HOURS
Status: DISCONTINUED | OUTPATIENT
Start: 2022-09-27 | End: 2022-09-30 | Stop reason: HOSPADM

## 2022-09-27 RX ORDER — TIZANIDINE 4 MG/1
4 TABLET ORAL
Status: DISCONTINUED | OUTPATIENT
Start: 2022-09-27 | End: 2022-09-30 | Stop reason: HOSPADM

## 2022-09-27 RX ORDER — ESCITALOPRAM OXALATE 10 MG/1
10 TABLET ORAL DAILY
Status: DISCONTINUED | OUTPATIENT
Start: 2022-09-28 | End: 2022-09-30 | Stop reason: HOSPADM

## 2022-09-27 RX ORDER — ASCORBIC ACID 500 MG
500 TABLET ORAL DAILY
COMMUNITY

## 2022-09-27 RX ORDER — TIZANIDINE 4 MG/1
4 TABLET ORAL
COMMUNITY

## 2022-09-27 RX ORDER — ALPRAZOLAM 0.25 MG/1
0.25 TABLET ORAL
COMMUNITY

## 2022-09-27 RX ORDER — CALCIUM CARBONATE 500(1250)
1 TABLET ORAL DAILY
COMMUNITY

## 2022-09-27 RX ORDER — PREGABALIN 150 MG/1
150 CAPSULE ORAL
COMMUNITY

## 2022-09-27 RX ORDER — OXYCODONE AND ACETAMINOPHEN 7.5; 325 MG/1; MG/1
TABLET ORAL
COMMUNITY
Start: 2022-09-25 | End: 2022-10-02

## 2022-09-27 RX ORDER — HYDROCODONE BITARTRATE AND ACETAMINOPHEN 7.5; 325 MG/1; MG/1
1 TABLET ORAL EVERY 8 HOURS
COMMUNITY

## 2022-09-27 RX ORDER — PREGABALIN 75 MG/1
150 CAPSULE ORAL
Status: DISCONTINUED | OUTPATIENT
Start: 2022-09-27 | End: 2022-09-30 | Stop reason: HOSPADM

## 2022-09-27 RX ADMIN — PREGABALIN 150 MG: 75 CAPSULE ORAL at 21:40

## 2022-09-27 RX ADMIN — HYDROCODONE BITARTRATE AND ACETAMINOPHEN 1 TABLET: 7.5; 325 TABLET ORAL at 21:41

## 2022-09-27 RX ADMIN — IOPAMIDOL 80 ML: 755 INJECTION, SOLUTION INTRAVENOUS at 12:31

## 2022-09-27 RX ADMIN — PIPERACILLIN AND TAZOBACTAM 4.5 G: 4; .5 INJECTION, POWDER, FOR SOLUTION INTRAVENOUS at 13:38

## 2022-09-27 RX ADMIN — HYDROCODONE BITARTRATE AND ACETAMINOPHEN 1 TABLET: 7.5; 325 TABLET ORAL at 16:22

## 2022-09-27 RX ADMIN — ALPRAZOLAM 0.25 MG: 0.25 TABLET ORAL at 18:37

## 2022-09-27 NOTE — ROUTINE PROCESS
Bedside and Verbal shift change report given to Delton Essex (oncoming nurse) by Christine Adams (offgoing nurse). Report included the following information SBAR, Kardex, Intake/Output, MAR, and Cardiac Rhythm SR/ST .

## 2022-09-27 NOTE — TELEPHONE ENCOUNTER
Pt called stating she had sx yesterday and her oxygen level is low and she is having a hard time breathing, I recommended her go  to the E.R  she understands this and agrees

## 2022-09-27 NOTE — ED TRIAGE NOTES
TRIAGE: Pt arrives with c/o sob and headache that started this morning. Pt had surgery yesterday on her shoulder. Denies CP. Pt noted to have O2 sats of 83% on RA in triage echavarria. Patient placed on 4L via NC with increase to 94%.

## 2022-09-27 NOTE — PROGRESS NOTES
Admission Medication Reconciliation:      Spoke with patient at the bedside in ED-22. Patient is a reliable historian. RX query is available at this time and aligns with her report.  was also queried. Interview included questions regarding use of:  PTA medications including prescription/OTC, vitamins, supplements, inhaled, topical, injectable, otic and ophthalmic medications  Alcohol, nicotine products, THC and related compounds, illicit drugs, stimulant use (i.e., Red Bull), agents used to assist with sleep and/or pain control issues, and whether patient uses other people's medications of any kind    Notes:  Hydrocodone/APAP: takes TID on scheduled basis, followed by pain management doctor (Dr. Tushar Shin)  Percocet: prescribed by Dr. Jarrell Rangel for break-through pain r/t to shoulder injury: #42 for 7 days supply only  Alprazolam: old script, uses very rarely- \"it's been several months or more\"- last Rx written 2/16/22 and filled 3/9/22 #10 for 30 days supply per   Pregabalin: recent dose increase to 150 mg     Medication changes (since last review): Added:  Vicodin  Percocet  Pregabalin  Tizanadine  Calcium supplement  Ascorbic acid supplement      Thank you for allowing me to participate in the care of your patient. Aileen AmadoD, RN # [phone]     [facility] pharmacy benefit data reflects medications filled and processed through the patient's insurance, however   this data does NOT capture whether the medication was picked up or is currently being taken by the patient.     Allergies:  Gabapentin    Significant PMH/Disease States:   Past Medical History:   Diagnosis Date    Anemia     Anemia     Anemia     Anemia     Anemia NEC     Blood transfusion     Bronchitis     Community acquired pneumonia     Gastritis     Gastrointestinal disorder     hiatal hernia, gastric ulcers    Hiatal hernia     IBS (irritable bowel syndrome)     Ill-defined condition     Anemia    Migraine     Sinus infection Chief Complaint for this Admission:    Chief Complaint   Patient presents with    Shortness of Breath    Headache     Prior to Admission Medications:   Prior to Admission Medications   Prescriptions Last Dose Informant Taking? ALPRAZolam (XANAX) 0.25 mg tablet 8/27/2022  Yes   Sig: Take 0.25 mg by mouth nightly as needed for Anxiety. Last filled 3/9/22 #10 for 30 days supply   HYDROcodone-acetaminophen (NORCO) 7.5-325 mg per tablet   Yes   Sig: Take 1 Tablet by mouth every eight (8) hours. Scheduled   ascorbic acid, vitamin C, (VITAMIN C) 500 mg tablet   Yes   Sig: Take 500 mg by mouth daily. calcium carbonate (OS-BREANA) 500 mg calcium (1,250 mg) tablet   Yes   Sig: Take 1 Tablet by mouth daily. escitalopram oxalate (LEXAPRO) 10 mg tablet 9/20/2022  Yes   Sig: Take 10 mg by mouth daily. lidocaine (LIDODERM) 5 %   Yes   Sig: Apply patch to the affected area for 12 hours a day and remove for 12 hours a day. lidocaine (XYLOCAINE) 5 % ointment   Yes   Sig: Apply to affected area twice daily as needed for pain   oxyCODONE-acetaminophen (PERCOCET 7.5) 7.5-325 mg per tablet   Yes   Sig: Take  by mouth every four (4) hours as needed for Pain. #42 for 7 days only   pregabalin (LYRICA) 150 mg capsule   Yes   Sig: Take 150 mg by mouth nightly. tiZANidine (ZANAFLEX) 4 mg tablet   Yes   Sig: Take 4 mg by mouth two (2) times daily as needed for Muscle Spasm(s). Facility-Administered Medications: None     Please contact the main inpatient pharmacy with any questions or concerns at [phone] and we will direct you to the clinical pharmacist covering this patient's care while in-house.    FARHAD Mathis

## 2022-09-27 NOTE — ED PROVIDER NOTES
Ms. Jl Ortiz is a 44yo female who presents to the ER with complaints of shortness of breath. She said that she began to feel short of breath this morning when she woke up. She felt well yesterday. She did have surgery on her right shoulder yesterday. She did not have any shortness of breath after the procedure before she went to bed. She said that she feels short of breath that is worse when she exerts herself. No chest pain. No runny nose, sore throat, cough. No changes with her urine or bowel movements. No leg swelling. She denies any other complaints. 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         Family History:   Problem Relation Age of Onset    Stroke Mother     Lung Disease Father     Bleeding Prob Father     Heart Disease Father     Hypertension Father     Anesth Problems Neg Hx        Social History     Socioeconomic History    Marital status: SINGLE     Spouse name: Not on file    Number of children: Not on file    Years of education: Not on file    Highest education level: Not on file   Occupational History    Not on file   Tobacco Use    Smoking status: Former     Packs/day: 0.50     Years: 10.00     Pack years: 5.00     Types: Cigars, Cigarettes    Smokeless tobacco: Current    Tobacco comments:     SMOKES CIGARS   Vaping Use    Vaping Use: Some days    Substances: Flavoring   Substance and Sexual Activity    Alcohol use:  Yes Alcohol/week: 0.0 standard drinks     Comment: Occ    Drug use: No     Types: Prescription    Sexual activity: Never     Comment: seperated--has 3 children   Other Topics Concern    Not on file   Social History Narrative    Not on file     Social Determinants of Health     Financial Resource Strain: Not on file   Food Insecurity: Not on file   Transportation Needs: Not on file   Physical Activity: Not on file   Stress: Not on file   Social Connections: Not on file   Intimate Partner Violence: Not on file   Housing Stability: Not on file         ALLERGIES: Gabapentin    Review of Systems   Constitutional:  Negative for chills and fever. HENT:  Negative for rhinorrhea and sore throat. Respiratory:  Positive for shortness of breath. Negative for cough. Cardiovascular:  Negative for chest pain. Gastrointestinal:  Negative for abdominal pain, diarrhea, nausea and vomiting. Genitourinary:  Negative for dysuria and urgency. Musculoskeletal:  Negative for arthralgias and back pain. Skin:  Negative for rash. Neurological:  Negative for dizziness, weakness and light-headedness. Vitals:    09/27/22 1055 09/27/22 1110   BP: 130/83    Pulse: (!) 121    Resp: 16    Temp: 98.2 °F (36.8 °C)    SpO2: (!) 83% 94%   Weight: 97.5 kg (215 lb)    Height: 5' 5\" (1.651 m)             Physical Exam     Vital signs reviewed. Nursing notes reviewed.     Const:  No acute distress, well developed, well nourished  Head:  Atraumatic, normocephalic  Eyes:  PERRL, conjunctiva normal, no scleral icterus  Neck:  Supple, trachea midline  Cardiovascular: Tachycardic  Resp:  No resp distress, no increased work of breathing, no wheezes, no rhonchi, no rales,  Abd:  Soft, non-tender, non-distended  MSK: Right upper extremity in bandage and sling  Neuro:  Alert and oriented x3, no cranial nerve defect  Skin:  Warm, dry, intact  Psych: normal mood and affect, behavior is normal, judgement and thought content is normal        MDM Procedures      EKG interpretation: (Preliminary)  Rhythm: sinus tachycardia; and regular . Rate (approx.): 107; P wave: normal; QRS interval: normal ; ST/T wave: non-specific changes; Other findings: abnormal ekg. Perfect Serve Consult for Admission  1:22 PM    ED Room Number: ER22/22  Patient Name and age:  Margaret Cabrales 48 y.o.  female  Working Diagnosis:   1. Hypoxia    2. SOB (shortness of breath)    3. Pneumonia of right lower lobe due to infectious organism    4. Elevated hemidiaphragm        COVID-19 Suspicion:  yes  Sepsis present:  no  Reassessment needed: no  Readmission: no  Isolation Requirements:  yes  Recommended Level of Care:  telemetry  Department:[facility] Adult ED - 21   Other:  had surgery yesterday. No PE on CT. Possible RLL infiltrate. Treated for possible aspiration. Ms. Donnell Hickey is a 44yo female who presents to the ER with complaints of SOB. Pt. Found to be hypoxic with oxygen sats in the lower 80s. No PE on CT. CT does show elevated hemidiaphragm and possible infiltrate. Concern for possible aspiration from surgery yesterday? I have covered her with abx. Pt.  To be evaluated for admission by the hospitalist.

## 2022-09-27 NOTE — CONSULTS
Pulmonary, Critical Care, and Sleep Medicine    Initial Patient Consult    Name: Veronique Garcia MRN: [id number]   : 1975 Hospital: [facility]   Date: 2022        IMPRESSION:   Acute hypoxic resp failure- suspect parlyzed right hemidiaph likely from cervical nerve root injury due to block used for right shoulder surgery. Anemia  Hiatal hernia  IBS      RECOMMENDATIONS:   Get sniff test  Place on CPAP tonight to prevent and treat further RLL ATX  PFTS as outpt  Conservative mgmt at this point and hopefully right diaph will recover fxn. If not can consider other options- by thoracic- plication, pacing etc.      Subjective:      Patient is a 48 y.o. female s/p right shoulder surgery at [facility] ambulatory surgery yesterday. Pt today with inc SOB. CTA no PE and elevated right hemidiaph . On NC O2. No cough or CP. No n/v/d. 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 Authorizing Provider   HYDROcodone-acetaminophen (NORCO) 7.5-325 mg per tablet Take 1 Tablet by mouth every eight (8) hours. Scheduled   Yes Provider, Historical   pregabalin (LYRICA) 150 mg capsule Take 150 mg by mouth nightly. Yes Provider, Historical   tiZANidine (ZANAFLEX) 4 mg tablet Take 4 mg by mouth two (2) times daily as needed for Muscle Spasm(s).    Yes Provider, Historical oxyCODONE-acetaminophen (PERCOCET 7.5) 7.5-325 mg per tablet Take  by mouth every four (4) hours as needed for Pain. #42 for 7 days only 9/25/22 10/2/22 Yes Provider, Historical   ascorbic acid, vitamin C, (VITAMIN C) 500 mg tablet Take 500 mg by mouth daily. Yes Provider, Historical   calcium carbonate (OS-BREANA) 500 mg calcium (1,250 mg) tablet Take 1 Tablet by mouth daily. Yes Provider, Historical   ALPRAZolam (XANAX) 0.25 mg tablet Take 0.25 mg by mouth nightly as needed for Anxiety. Last filled 3/9/22 #10 for 30 days supply   Yes Provider, Historical   escitalopram oxalate (LEXAPRO) 10 mg tablet Take 10 mg by mouth daily. 4/7/22  Yes Provider, Historical   lidocaine (LIDODERM) 5 % Apply patch to the affected area for 12 hours a day and remove for 12 hours a day. 5/3/22  Yes Roseann Abarca MD   lidocaine (XYLOCAINE) 5 % ointment Apply to affected area twice daily as needed for pain 5/3/22  Yes Roseann Abarca MD     Allergies   Allergen Reactions    Gabapentin Unknown (comments)      Social History     Tobacco Use    Smoking status: Former     Packs/day: 0.50     Years: 10.00     Pack years: 5.00     Types: Cigars, Cigarettes    Smokeless tobacco: Current    Tobacco comments:     SMOKES CIGARS   Substance Use Topics    Alcohol use: Yes     Alcohol/week: 0.0 standard drinks     Comment: Occ      Family History   Problem Relation Age of Onset    Stroke Mother     Lung Disease Father     Bleeding Prob Father     Heart Disease Father     Hypertension Father     Anesth Problems Neg Hx         Current Facility-Administered Medications   Medication Dose Route Frequency    [START ON 9/28/2022] escitalopram oxalate (LEXAPRO) tablet 10 mg  10 mg Oral DAILY    HYDROcodone-acetaminophen (NORCO) 7.5-325 mg per tablet 1 Tablet  1 Tablet Oral Q8H    pregabalin (LYRICA) capsule 150 mg  150 mg Oral QHS       Review of Systems:  Review of systems not obtained due to patient factors.     Objective:   Vital Signs:    Visit Vitals  /84   Pulse 94   Temp 98.5 °F (36.9 °C)   Resp 16   Ht 5' 5\" (1.651 m)   Wt 97.5 kg (215 lb)   LMP 2012   SpO2 94%   BMI 35.78 kg/m²       O2 Device: Nasal cannula   O2 Flow Rate (L/min): 4 l/min   Temp (24hrs), Av.3 °F (36.8 °C), Min:98.1 °F (36.7 °C), Max:98.5 °F (36.9 °C)       Intake/Output:   Last shift:      No intake/output data recorded. Last 3 shifts: No intake/output data recorded. No intake or output data in the 24 hours ending 22 1642   Physical Exam:   General:  Alert, cooperative, no distress, appears stated age. Head:  Normocephalic, without obvious abnormality, atraumatic. Eyes:  Conjunctivae/corneas clear. Lungs:   Clear to auscultation bilaterally. Chest wall:  No tenderness or deformity. Heart:  Regular rate and rhythm, S1, S2 normal, no murmur, click, rub or gallop. Abdomen:   Soft, non-tender. Bowel sounds normal. No masses,  No organomegaly. Extremities: Extremities normal, atraumatic, no cyanosis or edema. Pulses: 2+ and symmetric all extremities.                  Data review:     Recent Results (from the past 24 hour(s))   EKG, 12 LEAD, INITIAL    Collection Time: 22 11:00 AM   Result Value Ref Range    Ventricular Rate 107 BPM    Atrial Rate 107 BPM    P-R Interval 134 ms    QRS Duration 82 ms    Q-T Interval 336 ms    QTC Calculation (Bezet) 448 ms    Calculated P Axis 57 degrees    Calculated R Axis 15 degrees    Calculated T Axis 19 degrees    Diagnosis       Sinus tachycardia  Cannot rule out Anterior infarct , age undetermined  Abnormal ECG  When compared with ECG of 21-MAY-2012 16:07,  No significant change was found  Confirmed by Kasey Storey ([id number]) on 2022 2:10:46 PM     CBC WITH AUTOMATED DIFF    Collection Time: 22 11:07 AM   Result Value Ref Range    WBC 11.0 3.6 - 11.0 K/uL    RBC 3.92 3.80 - 5.20 M/uL    HGB 10.7 (L) 11.5 - 16.0 g/dL    HCT 34.6 (L) 35.0 - 47.0 %    MCV 88.3 80.0 - 99.0 FL    MCH 27.3 26.0 - 34.0 PG    MCHC 30.9 30.0 - 36.5 g/dL    RDW 15.9 (H) 11.5 - 14.5 %    PLATELET 850 (H) 367 - 400 K/uL    MPV 10.6 8.9 - 12.9 FL    NRBC 0.0 0  WBC    ABSOLUTE NRBC 0.00 0.00 - 0.01 K/uL    NEUTROPHILS 83 (H) 32 - 75 %    LYMPHOCYTES 11 (L) 12 - 49 %    MONOCYTES 5 5 - 13 %    EOSINOPHILS 0 0 - 7 %    BASOPHILS 0 0 - 1 %    IMMATURE GRANULOCYTES 1 (H) 0.0 - 0.5 %    ABS. NEUTROPHILS 9.2 (H) 1.8 - 8.0 K/UL    ABS. LYMPHOCYTES 1.2 0.8 - 3.5 K/UL    ABS. MONOCYTES 0.6 0.0 - 1.0 K/UL    ABS. EOSINOPHILS 0.0 0.0 - 0.4 K/UL    ABS. BASOPHILS 0.0 0.0 - 0.1 K/UL    ABS. IMM. GRANS. 0.1 (H) 0.00 - 0.04 K/UL    DF AUTOMATED     METABOLIC PANEL, COMPREHENSIVE    Collection Time: 09/27/22 11:07 AM   Result Value Ref Range    Sodium 138 136 - 145 mmol/L    Potassium 4.0 3.5 - 5.1 mmol/L    Chloride 107 97 - 108 mmol/L    CO2 27 21 - 32 mmol/L    Anion gap 4 (L) 5 - 15 mmol/L    Glucose 112 (H) 65 - 100 mg/dL    BUN 14 6 - 20 MG/DL    Creatinine 0.78 0.55 - 1.02 MG/DL    BUN/Creatinine ratio 18 12 - 20      GFR est AA >60 >60 ml/min/1.73m2    GFR est non-AA >60 >60 ml/min/1.73m2    Calcium 9.3 8.5 - 10.1 MG/DL    Bilirubin, total 0.2 0.2 - 1.0 MG/DL    ALT (SGPT) 43 12 - 78 U/L    AST (SGOT) 61 (H) 15 - 37 U/L    Alk. phosphatase 103 45 - 117 U/L    Protein, total 8.2 6.4 - 8.2 g/dL    Albumin 3.1 (L) 3.5 - 5.0 g/dL    Globulin 5.1 (H) 2.0 - 4.0 g/dL    A-G Ratio 0.6 (L) 1.1 - 2.2     SAMPLES BEING HELD    Collection Time: 09/27/22 11:07 AM   Result Value Ref Range    SAMPLES BEING HELD 1RED 1BLU     COMMENT        Add-on orders for these samples will be processed based on acceptable specimen integrity and analyte stability, which may vary by analyte.    NT-PRO BNP    Collection Time: 09/27/22 11:07 AM   Result Value Ref Range    NT pro- (H) <125 PG/ML   TROPONIN-HIGH SENSITIVITY    Collection Time: 09/27/22 11:07 AM   Result Value Ref Range    Troponin-High Sensitivity <4 0 - 51 ng/L   COVID-19 RAPID TEST Collection Time: 09/27/22  1:42 PM   Result Value Ref Range    Specimen source Nasopharyngeal      COVID-19 rapid test Not detected NOTD     POC G3 - PUL    Collection Time: 09/27/22  3:21 PM   Result Value Ref Range    FIO2 (POC) 36 %    pH (POC) 7.36 7.35 - 7.45      pCO2 (POC) 46.6 (H) 35.0 - 45.0 MMHG    pO2 (POC) 67 (L) 80 - 100 MMHG    HCO3 (POC) 26.6 (H) 22 - 26 MMOL/L    sO2 (POC) 92.2 92 - 97 %    Base excess (POC) 0.8 mmol/L    Site LEFT RADIAL      Device: NASAL CANNULA      Set Rate 22 bpm    Allens test (POC) Positive      Specimen type (POC) ARTERIAL         Imaging:  I have personally reviewed the patients radiographs and have reviewed the reports:  CTA chest no PE but markedly elevated right rodrigo no ILD no ASD        Maureen Pantoja MD

## 2022-09-27 NOTE — ED NOTES
TRANSFER - OUT REPORT:    Verbal report given to RN(name) on April Martino  being transferred to  440/01(unit) for routine progression of care       Report consisted of patients Situation, Background, Assessment and   Recommendations(SBAR). Information from the following report(s) SBAR, Kardex, ED Summary, Intake/Output, MAR, Recent Results, Med Rec Status, and Cardiac Rhythm NSR/ST  was reviewed with the receiving nurse. Lines:   Peripheral IV 09/27/22 Left Forearm (Active)   Site Assessment Clean, dry, & intact 09/27/22 1113   Phlebitis Assessment 0 09/27/22 1113   Infiltration Assessment 0 09/27/22 1113   Dressing Status Clean, dry, & intact 09/27/22 1113   Hub Color/Line Status Pink 09/27/22 1113   Action Taken Blood drawn 09/27/22 1113   Alcohol Cap Used No 09/27/22 1113        Opportunity for questions and clarification was provided.       Patient transported with:   Monitor  O2 @ 4 liters  Registered Nurse

## 2022-09-27 NOTE — ED NOTES
Called 4S, RN unavailable settling a patient that just arrived and Charge RN off the unit with a patient at this time.

## 2022-09-27 NOTE — H&P
[address] [facility]'s Adult  Hospitalist Group  History and Physical    Date of Service:  9/27/2022  Primary Care Provider: None  Source of information: The patient and Chart review    Chief Complaint: Shortness of Breath and Headache      History of Presenting Illness:   Melene Aschoff is a 48 y.o. female who had right shoulder repair yesterday came to the ED today for shortness of breath and hypoxia. Patient's other significant history is complicated abdominal hernia repair  a year ago. She is chronic smoker however never been diagnosed with COPD or other pulmonary disease. Upon arrival in the ED SPO2 was 83% on room air so she was placed on 4 L of oxygen via nasal cannula. She was also tachycardic with heart rate of 121. Patient denied fever or chills or chest pain. No sick contact or exposure to COVID. CBC with normal WBC, neutrophilia. CMP unremarkable. NT proBNP 356 and at bedtime troponin under 4. CTA of the lung was negative for PE but showed significantly elevated right diaphragm with right lower lobe atelectasis. I have reviewed the CT imaging personally. I am concerned about diaphoretic paralysis due to phrenic nerve injury given the occurrence of her symptoms surrounding recent shoulder repair and I have placed an emergent pulmonary consult. REVIEW OF SYSTEMS:  A comprehensive review of systems was negative except for that written in the History of Present Illness.      Past Medical History:   Diagnosis Date    Anemia     Anemia     Anemia     Anemia     Anemia NEC     Blood transfusion     Bronchitis     Community acquired pneumonia     Gastritis     Gastrointestinal disorder     hiatal hernia, gastric ulcers    Hiatal hernia     IBS (irritable bowel syndrome)     Ill-defined condition     Anemia    Migraine     Sinus infection       Past Surgical History:   Procedure Laterality Date    HX HERNIA REPAIR  2013    Nissen fundoplication    HX HERNIA REPAIR  7/24/2014    HX HYSTERECTOMY  2012    HX KNEE ARTHROSCOPY      HX OTHER SURGICAL      noroplant removed from L arm     Prior to Admission medications    Medication Sig Start Date End Date Taking? Authorizing Provider   HYDROcodone-acetaminophen (NORCO) 7.5-325 mg per tablet Take 1 Tablet by mouth every eight (8) hours. Scheduled   Yes Provider, Historical   pregabalin (LYRICA) 150 mg capsule Take 150 mg by mouth nightly. Yes Provider, Historical   tiZANidine (ZANAFLEX) 4 mg tablet Take 4 mg by mouth two (2) times daily as needed for Muscle Spasm(s). Yes Provider, Historical   oxyCODONE-acetaminophen (PERCOCET 7.5) 7.5-325 mg per tablet Take  by mouth every four (4) hours as needed for Pain. #42 for 7 days only 9/25/22 10/2/22 Yes Provider, Historical   ascorbic acid, vitamin C, (VITAMIN C) 500 mg tablet Take 500 mg by mouth daily. Yes Provider, Historical   calcium carbonate (OS-BREANA) 500 mg calcium (1,250 mg) tablet Take 1 Tablet by mouth daily. Yes Provider, Historical   ALPRAZolam (XANAX) 0.25 mg tablet Take 0.25 mg by mouth nightly as needed for Anxiety. Last filled 3/9/22 #10 for 30 days supply   Yes Provider, Historical   escitalopram oxalate (LEXAPRO) 10 mg tablet Take 10 mg by mouth daily. 4/7/22  Yes Provider, Historical   lidocaine (LIDODERM) 5 % Apply patch to the affected area for 12 hours a day and remove for 12 hours a day. 5/3/22  Yes Simba Hollins MD   lidocaine (XYLOCAINE) 5 % ointment Apply to affected area twice daily as needed for pain 5/3/22  Yes Simba Hollins MD     Allergies   Allergen Reactions    Gabapentin Unknown (comments)      Family History   Problem Relation Age of Onset    Stroke Mother     Lung Disease Father     Bleeding Prob Father     Heart Disease Father     Hypertension Father     Anesth Problems Neg Hx       Social History:  reports that she has quit smoking. Her smoking use included cigars and cigarettes. She has a 5.00 pack-year smoking history.  She uses smokeless tobacco. She reports current alcohol use. She reports that she does not use drugs. Family and social history were personally reviewed, all pertinent and relevant details are outlined as above. Objective:   Visit Vitals  /84   Pulse 94   Temp 98.5 °F (36.9 °C)   Resp 16   Ht 5' 5\" (1.651 m)   Wt 97.5 kg (215 lb)   LMP 01/04/2012   SpO2 94%   BMI 35.78 kg/m²    O2 Flow Rate (L/min): 4 l/min O2 Device: Nasal cannula    PHYSICAL EXAM:        Constitutional: Lying in bed on oxygen 4 L/min via nasal cannula SPO2 upper 90s. She is not in distress at present. HENT:  Oral mucosa moist, oropharynx benign. Eyes: Pupils are symmetrical, equal and reactive. Anicteric sclera, no pallor. Resp: Diminished/absent entry on the right mid to lower posterior lung field. CV:  Regular rhythm, normal rate, no murmurs, gallops, rubs    GI: Nondistended abdomen. Normoactive bowel sounds. Soft,non tender. No appreciable hepatosplenomegaly. Musculoskeletal:  No edema, warm, 2+ pulses throughout    Neurologic: Mental status: Alert, orientated to place, person and time. Cranial nerves:CN II-XII reviewed, grossly intact  Motor exam: Moves all extremities symmetrically, no gross focal deficit. Data Review: All diagnostic labs and studies have been reviewed. Abnormal Labs Reviewed   CBC WITH AUTOMATED DIFF - Abnormal; Notable for the following components:       Result Value    HGB 10.7 (*)     HCT 34.6 (*)     RDW 15.9 (*)     PLATELET 980 (*)     NEUTROPHILS 83 (*)     LYMPHOCYTES 11 (*)     IMMATURE GRANULOCYTES 1 (*)     ABS. NEUTROPHILS 9.2 (*)     ABS. IMM.  GRANS. 0.1 (*)     All other components within normal limits   METABOLIC PANEL, COMPREHENSIVE - Abnormal; Notable for the following components:    Anion gap 4 (*)     Glucose 112 (*)     AST (SGOT) 61 (*)     Albumin 3.1 (*)     Globulin 5.1 (*)     A-G Ratio 0.6 (*)     All other components within normal limits   NT-PRO BNP - Abnormal; Notable for the following components:    NT pro- (*)     All other components within normal limits   POC G3 - PUL - Abnormal; Notable for the following components:    pCO2 (POC) 46.6 (*)     pO2 (POC) 67 (*)     HCO3 (POC) 26.6 (*)     All other components within normal limits       All Micro Results       Procedure Component Value Units Date/Time    COVID-19 RAPID TEST [[id number]] Collected: 09/27/22 1342    Order Status: Completed Specimen: Nasopharyngeal Updated: 09/27/22 1434     Specimen source Nasopharyngeal        COVID-19 rapid test Not detected        Comment: Rapid Abbott ID Now       Rapid NAAT:  The specimen is NEGATIVE for SARS-CoV-2, the novel coronavirus associated with COVID-19. Negative results should be treated as presumptive and, if inconsistent with clinical signs and symptoms or necessary for patient management, should be tested with an alternative molecular assay. Negative results do not preclude SARS-CoV-2 infection and should not be used as the sole basis for patient management decisions. This test has been authorized by the FDA under an Emergency Use Authorization (EUA) for use by authorized laboratories. Fact sheet for Healthcare Providers: ConventionUpdate.co.nz  Fact sheet for Patients: ConventionUpdate.co.nz       Methodology: Isothermal Nucleic Acid Amplification                 IMAGING:   CTA CHEST W OR W WO CONT   Final Result   1. No CT evidence for central pulmonary embolus at this time . 2. Significant right hemidiaphragm elevation and right lower lobe atelectasis or   infiltrate with volume loss. This is new since the prior study in 2012. 3. Incidental findings in the upper abdomen. 4. Postoperative changes in the right shoulder girdle.          XR SNIFF TEST    (Results Pending)        ECG/ECHO:    Results for orders placed or performed during the hospital encounter of 09/27/22   EKG, 12 LEAD, INITIAL   Result Value Ref Range Ventricular Rate 107 BPM    Atrial Rate 107 BPM    P-R Interval 134 ms    QRS Duration 82 ms    Q-T Interval 336 ms    QTC Calculation (Bezet) 448 ms    Calculated P Axis 57 degrees    Calculated R Axis 15 degrees    Calculated T Axis 19 degrees    Diagnosis       Sinus tachycardia  Cannot rule out Anterior infarct , age undetermined  Abnormal ECG  When compared with ECG of 21-MAY-2012 16:07,  No significant change was found  Confirmed by Kasey Storey ([id number]) on 9/27/2022 2:10:46 PM          Assessment and Plan   Given the patient's current clinical presentation, there is a high level of concern for decompensation if discharged from the emergency department. Complex decision making was performed, which includes reviewing the patient's available past medical records, laboratory results, and imaging studies. Acute hypoxic respiratory failure most probably due to right diaphragmatic paralysis. febrile. Patient does not have significant cough nor is she febrile. WBC normal, the airspace disease mentioned on CT is most probably due to atelectasis. As such no evidence of pneumonia. --Admit patient to the [facility] as she may need an PPV. --Continue supplemental oxygen to keep SPO2 above 94%. --Emergent consulted pulmonary. Pulmonary has evaluated, input appreciated. --No indication to continue antibiotics. --Rapid COVID test is negative. Chronic pain syndrome: Patient had extensive complicated fundoplication and hernia repair with resultant chronic abdominal pain for which she is under the care of pain specialist.  Home regimen continued.         CODE STATUS: Full code  Surrogate decision maker/emergency contact:  Extended Emergency Contact Information  Primary Emergency Contact: Xiomara Mullisn Phone: [phone]  Mobile Phone: [phone]  Relation: Parent  Secondary Emergency Contact: Rudy Caicedo  [place] Phone: [phone]  Relation: Sister    DVT PROPHYLAXIS: SCDs  ANTICIPATED DISCHARGE: 24-48 hours. CRITICAL CARE WAS PERFORMED FOR THIS ENCOUNTER: YES. I had a face to face encounter with the patient, reviewed and interpreted patient data including clinical events, labs, images, vital signs, I/O's, and examined patient. I have discussed the case and the plan and management of the patient's care with the consulting services, the bedside nurses and necessary ancillary providers. This patient has a high probability of imminent, clinically significant deterioration, which requires the highest level of preparedness to intervene urgently. I participated in the decision-making and personally managed or directed the management of the following life and organ supporting interventions that required my frequent assessment to treat or prevent imminent deterioration. I personally spent 35 minutes of critical care time. This is time spent at this critically ill patient's bedside actively involved in patient care as well as the coordination of care and discussions with the patient's family. This does not include any procedural time which has been billed separately. Signed By: Dennis Parsons MD     September 27, 2022         Please note that this dictation may have been completed with Dragon, the Zample voice recognition software. Quite often unanticipated grammatical, syntax, homophones, and other interpretive errors are inadvertently transcribed by the computer software. Please disregard these errors. Please excuse any errors that have escaped final proofreading.

## 2022-09-28 ENCOUNTER — APPOINTMENT (OUTPATIENT)
Dept: GENERAL RADIOLOGY | Age: 47
DRG: 917 | End: 2022-09-28
Attending: INTERNAL MEDICINE
Payer: COMMERCIAL

## 2022-09-28 ENCOUNTER — APPOINTMENT (OUTPATIENT)
Dept: MRI IMAGING | Age: 47
DRG: 917 | End: 2022-09-28
Attending: HOSPITALIST
Payer: COMMERCIAL

## 2022-09-28 PROCEDURE — 65660000001 HC RM ICU INTERMED STEPDOWN

## 2022-09-28 PROCEDURE — 72141 MRI NECK SPINE W/O DYE: CPT

## 2022-09-28 PROCEDURE — 94660 CPAP INITIATION&MGMT: CPT

## 2022-09-28 PROCEDURE — 76000 FLUOROSCOPY <1 HR PHYS/QHP: CPT

## 2022-09-28 PROCEDURE — 74011250636 HC RX REV CODE- 250/636: Performed by: HOSPITALIST

## 2022-09-28 PROCEDURE — 74011250637 HC RX REV CODE- 250/637: Performed by: HOSPITALIST

## 2022-09-28 RX ORDER — DIAZEPAM 10 MG/2ML
2.5 INJECTION INTRAMUSCULAR ONCE
Status: COMPLETED | OUTPATIENT
Start: 2022-09-28 | End: 2022-09-28

## 2022-09-28 RX ADMIN — HYDROCODONE BITARTRATE AND ACETAMINOPHEN 1 TABLET: 7.5; 325 TABLET ORAL at 21:19

## 2022-09-28 RX ADMIN — DIAZEPAM 2.5 MG: 5 INJECTION, SOLUTION INTRAMUSCULAR; INTRAVENOUS at 18:11

## 2022-09-28 RX ADMIN — HYDROCODONE BITARTRATE AND ACETAMINOPHEN 1 TABLET: 7.5; 325 TABLET ORAL at 06:34

## 2022-09-28 RX ADMIN — HYDROCODONE BITARTRATE AND ACETAMINOPHEN 1 TABLET: 7.5; 325 TABLET ORAL at 14:40

## 2022-09-28 RX ADMIN — PREGABALIN 150 MG: 75 CAPSULE ORAL at 21:19

## 2022-09-28 RX ADMIN — ESCITALOPRAM OXALATE 10 MG: 10 TABLET ORAL at 09:33

## 2022-09-28 NOTE — PROGRESS NOTES
[address] [facility] Adult  Hospitalist Group  Progress note    Date of Service:  9/28/2022           Alek Wiley is a 48 y.o. female who had right shoulder repair yesterday came to the ED today for shortness of breath and hypoxia. Patient's other significant history is complicated abdominal hernia repair  a year ago. She is chronic smoker however never been diagnosed with COPD or has other pulmonary disease. Upon arrival in the ED SPO2 was 83% on room air so she was placed on 4 L of oxygen via nasal cannula. She was also tachycardic with heart rate of 121. Patient denied fever or chills or chest pain. No sick contact or exposure to COVID. CBC with normal WBC, neutrophilia. CMP unremarkable. NT proBNP 356 and at bedtime troponin under 4. CTA of the lung was negative for PE but showed significantly elevated right diaphragm with right lower lobe atelectasis. Subjective/interval history. --Currently on nasal CPAP. --Post op pain in the  right shoulder. --Updated her sister at bedside per patient request.      Assessment and Plan     Acute hypoxic respiratory failure most probably due to right diaphragmatic paralysis. febrile. Patient does not have significant cough nor is she febrile. WBC normal, the airspace disease mentioned on CT is most probably due to atelectasis. As such no evidence of pneumonia. --Continue  NPPV. --Continue supplemental oxygen to keep SPO2 above 94%. --Pulmonary has evaluated, input appreciated. fluoroscopic sniff test demonstrated minimal movement of the right hemidiaphragm. --No indication to continue antibiotics. --Rapid COVID test is negative. --Discussed with pulmonary, recommended C-spine MRI, ordered. Chronic pain syndrome: Patient had extensive complicated fundoplication and hernia repair with resultant chronic abdominal pain for which she is under the care of pain specialist.  Home regimen continued.     Shoulder impingement syndrome, right  --Status post recent repair  --sent [employer] PS message to her orhto surgeon Dr Brian Bernard in case he wants to see her while inpatient     Chronic mild anemia,unspecified. --Check iron/ferritin,folate,B12        CODE STATUS: Full code  Surrogate decision maker/emergency contact:  Extended Emergency Contact Information  Primary Emergency Contact: Cate Morning, [address] Phone: [phone]  Mobile Phone: [phone]  Relation: Parent  Secondary Emergency Contact: Otis Medrano  [place] Phone: [phone]  Relation: Sister    DVT PROPHYLAXIS: SCDs  ANTICIPATED DISCHARGE: 24-48 hours. REVIEW OF SYSTEMS:  A comprehensive review of systems was negative except for that written in the History of Present Illness. Past Medical History:   Diagnosis Date    Anemia     Anemia     Anemia     Anemia     Anemia NEC     Blood transfusion     Bronchitis     Community acquired pneumonia     Gastritis     Gastrointestinal disorder     hiatal hernia, gastric ulcers    Hiatal hernia     IBS (irritable bowel syndrome)     Ill-defined condition     Anemia    Migraine     Sinus infection       Past Surgical History:   Procedure Laterality Date    HX HERNIA REPAIR  2013    Nissen fundoplication    HX HERNIA REPAIR  7/24/2014    HX HYSTERECTOMY  2012    HX KNEE ARTHROSCOPY      HX OTHER SURGICAL      noroplant removed from L arm     Prior to Admission medications    Medication Sig Start Date End Date Taking? Authorizing Provider   HYDROcodone-acetaminophen (NORCO) 7.5-325 mg per tablet Take 1 Tablet by mouth every eight (8) hours. Scheduled   Yes Provider, Historical   pregabalin (LYRICA) 150 mg capsule Take 150 mg by mouth nightly. Yes Provider, Historical   tiZANidine (ZANAFLEX) 4 mg tablet Take 4 mg by mouth two (2) times daily as needed for Muscle Spasm(s).    Yes Provider, Historical   oxyCODONE-acetaminophen (PERCOCET 7.5) 7.5-325 mg per tablet Take  by mouth every four (4) hours as needed for Pain. #42 for 7 days only 9/25/22 10/2/22 Yes Provider, Historical   ascorbic acid, vitamin C, (VITAMIN C) 500 mg tablet Take 500 mg by mouth daily. Yes Provider, Historical   calcium carbonate (OS-BREANA) 500 mg calcium (1,250 mg) tablet Take 1 Tablet by mouth daily. Yes Provider, Historical   ALPRAZolam (XANAX) 0.25 mg tablet Take 0.25 mg by mouth nightly as needed for Anxiety. Last filled 3/9/22 #10 for 30 days supply   Yes Provider, Historical   escitalopram oxalate (LEXAPRO) 10 mg tablet Take 10 mg by mouth daily. 4/7/22  Yes Provider, Historical   lidocaine (LIDODERM) 5 % Apply patch to the affected area for 12 hours a day and remove for 12 hours a day. 5/3/22  Yes Simba Hollins MD   lidocaine (XYLOCAINE) 5 % ointment Apply to affected area twice daily as needed for pain 5/3/22  Yes Simba Hollins MD     Allergies   Allergen Reactions    Gabapentin Unknown (comments)      Family History   Problem Relation Age of Onset    Stroke Mother     Lung Disease Father     Bleeding Prob Father     Heart Disease Father     Hypertension Father     Anesth Problems Neg Hx       Social History:  reports that she has quit smoking. Her smoking use included cigars and cigarettes. She has a 5.00 pack-year smoking history. She uses smokeless tobacco. She reports current alcohol use. She reports that she does not use drugs. Family and social history were personally reviewed, all pertinent and relevant details are outlined as above. Objective:   Visit Vitals  /72   Pulse 82   Temp 98.2 °F (36.8 °C)   Resp 14   Ht 5' 5\" (1.651 m)   Wt 98.4 kg (216 lb 14.4 oz)   LMP 01/04/2012   SpO2 95%   BMI 36.09 kg/m²    O2 Flow Rate (L/min): 4 l/min O2 Device: Nasal cannula    PHYSICAL EXAM:        Constitutional: Currently alert oriented, not in distress. HENT:  Oral mucosa moist, oropharynx benign. Eyes: Pupils are symmetrical, equal and reactive. Anicteric sclera, no pallor. Resp: On nasal CPAP. Diminished/absent entry on the right mid to lower posterior lung field. CV:  Regular rhythm, normal rate, no murmurs, gallops, rubs    GI: Nondistended abdomen. Normoactive bowel sounds. Soft,non tender. No appreciable hepatosplenomegaly. Musculoskeletal: Right shoulder bandaged and in arm sling    Neurologic: Mental status: Alert, orientated to place, person and time. Cranial nerves:CN II-XII reviewed, grossly intact  Motor exam: Moves all extremities symmetrically, no gross focal deficit. Data Review: All diagnostic labs and studies have been reviewed. Abnormal Labs Reviewed   CBC WITH AUTOMATED DIFF - Abnormal; Notable for the following components:       Result Value    HGB 10.7 (*)     HCT 34.6 (*)     RDW 15.9 (*)     PLATELET 278 (*)     NEUTROPHILS 83 (*)     LYMPHOCYTES 11 (*)     IMMATURE GRANULOCYTES 1 (*)     ABS. NEUTROPHILS 9.2 (*)     ABS. IMM. GRANS. 0.1 (*)     All other components within normal limits   METABOLIC PANEL, COMPREHENSIVE - Abnormal; Notable for the following components:    Anion gap 4 (*)     Glucose 112 (*)     AST (SGOT) 61 (*)     Albumin 3.1 (*)     Globulin 5.1 (*)     A-G Ratio 0.6 (*)     All other components within normal limits   NT-PRO BNP - Abnormal; Notable for the following components:    NT pro- (*)     All other components within normal limits   POC G3 - PUL - Abnormal; Notable for the following components:    pCO2 (POC) 46.6 (*)     pO2 (POC) 67 (*)     HCO3 (POC) 26.6 (*)     All other components within normal limits       All Micro Results       Procedure Component Value Units Date/Time    COVID-19 RAPID TEST [[id number]] Collected: 09/27/22 1342    Order Status: Completed Specimen: Nasopharyngeal Updated: 09/27/22 1434     Specimen source Nasopharyngeal        COVID-19 rapid test Not detected        Comment: Rapid Abbott ID Now       Rapid NAAT:  The specimen is NEGATIVE for SARS-CoV-2, the novel coronavirus associated with COVID-19. Negative results should be treated as presumptive and, if inconsistent with clinical signs and symptoms or necessary for patient management, should be tested with an alternative molecular assay. Negative results do not preclude SARS-CoV-2 infection and should not be used as the sole basis for patient management decisions. This test has been authorized by the FDA under an Emergency Use Authorization (EUA) for use by authorized laboratories. Fact sheet for Healthcare Providers: Hmizate.madate.co.nz  Fact sheet for Patients: Alo7.co.nz       Methodology: Isothermal Nucleic Acid Amplification                 IMAGING:   CTA CHEST W OR W WO CONT   Final Result   1. No CT evidence for central pulmonary embolus at this time . 2. Significant right hemidiaphragm elevation and right lower lobe atelectasis or   infiltrate with volume loss. This is new since the prior study in 2012. 3. Incidental findings in the upper abdomen. 4. Postoperative changes in the right shoulder girdle.          XR SNIFF TEST    (Results Pending)   MRI CERV SPINE WO CONT    (Results Pending)        ECG/ECHO:    Results for orders placed or performed during the hospital encounter of 09/27/22   EKG, 12 LEAD, INITIAL   Result Value Ref Range    Ventricular Rate 107 BPM    Atrial Rate 107 BPM    P-R Interval 134 ms    QRS Duration 82 ms    Q-T Interval 336 ms    QTC Calculation (Bezet) 448 ms    Calculated P Axis 57 degrees    Calculated R Axis 15 degrees    Calculated T Axis 19 degrees    Diagnosis       Sinus tachycardia  Cannot rule out Anterior infarct , age undetermined  Abnormal ECG  When compared with ECG of 21-MAY-2012 16:07,  No significant change was found  Confirmed by Kong Rodgers ([id number]) on 9/27/2022 2:10:46 PM            Signed By: Trip German MD     September 28, 2022         Please note that this dictation may have been completed with Mone Gutierrez, the [employer] voice recognition software. Quite often unanticipated grammatical, syntax, homophones, and other interpretive errors are inadvertently transcribed by the computer software. Please disregard these errors. Please excuse any errors that have escaped final proofreading.

## 2022-09-28 NOTE — PROGRESS NOTES
INES:  RUR: 7%    Disposition:  home once medically stable    Chart reviewed. Patient admitted here on 9/27/22 with complaints of SOB and headache. Adm dx:  acute hypoxic respiratory failure  The patient had (r) shoulder surgery on 9/26/22. The patient has a past medial hx of  Anemia, Bronchitis, Gastrointestinal Disorder (hiatal hernia, gastric ulcers, IBS, Migraine and Sinus Infection). CM met with the patient to introduce self and role. The patient lives with her mom and children (son/daughter) in a 2-story residence. Her bedroom is on the 2nd floor. The patient sleeps  in a recliner. Demographics verified. The patients desires to have her sister Rosalinda Schirmer [phone]) as her emergency contact as well as Healthcare decision-maker. Care Management Interventions  PCP Verified by CM: No  Last Visit to PCP: 04/28/22  Mode of Transport at Discharge: Other (see comment)  Transition of Care Consult (CM Consult): Discharge Planning  Discharge Durable Medical Equipment: No  Physical Therapy Consult: No  Occupational Therapy Consult: No  Speech Therapy Consult: No  Support Systems: Parent(s), Child(francisco)  Confirm Follow Up Transport: Family  Discharge Location  Patient Expects to be Discharged to[de-identified] Home     Pharm:  [facility]    DME: none    The patient is a med tech by profession. She has been out of work since 2021 following hernia surgery. The patient had a shoulder injury at work [address]) while lifting a patient ([id number])    Patient has a workman's comp   (Adore Mansfield [phone]) and gives CM permission to communicate with this CM.     Reason for Admission:                       RUR Score:   7%                  Plan for utilizing home health:      tbd    PCP: First and Last name: Jennifer Huggins     Name of Practice: [facility]  Internal Medicine and Pain Mgmt   Are you a current patient: Yes/No: yes    Approximate date of last visit: April 2022   Can you participate in a virtual visit with your PCP:                     Current Advanced Directive/Advance Care Plan: Full Code      Healthcare Decision Maker:   Click here to complete [address] including selection of the Healthcare Decision Maker Relationship (ie \"Primary\")             Primary Decision Maker: Parisumm Thiago Norwood - [phone]                  Transition of Care Plan:   home once medically stable      Per IDR, MARY planned for Friday 9/30/22. CM continuing to follow. Barby Medina, [address], [address] [place] [address]

## 2022-09-28 NOTE — PROGRESS NOTES
Pulmonary, Critical Care, and Sleep Medicine        Name: Farshad Sanchez MRN: [id number]   : 1975 Hospital: . [facility]    Date: 2022        IMPRESSION:   Acute hypoxic resp failure- suspect parlyzed right hemidiaph likely from cervical nerve root injury due to block used for right shoulder surgery. Anemia  Hiatal hernia  IBS      RECOMMENDATIONS:   Awaiting official sniff read but looks like right diaph is paralyzed or paretic. D/w hospitalist- get MRI C spine eval for cervical nerve root injury or compressive hematoma  Place on CPAP tonight to prevent and treat further RLL ATX  PFTS as outpt  Conservative mgmt at this point and hopefully right diaph will recover fxn. If not can consider other options- by thoracic- plication, pacing etc.      Subjective:      Alert tolerating CPAP QHS  Less SOB      Past Medical History:   Diagnosis Date    Anemia     Anemia     Anemia     Anemia     Anemia NEC     Blood transfusion     Bronchitis     Community acquired pneumonia     Gastritis     Gastrointestinal disorder     hiatal hernia, gastric ulcers    Hiatal hernia     IBS (irritable bowel syndrome)     Ill-defined condition     Anemia    Migraine     Sinus infection       Past Surgical History:   Procedure Laterality Date    HX HERNIA REPAIR      Nissen fundoplication    HX HERNIA REPAIR  2014    HX HYSTERECTOMY      HX KNEE ARTHROSCOPY      HX OTHER SURGICAL      noroplant removed from L arm      Prior to Admission medications    Medication Sig Start Date End Date Taking? Authorizing Provider   HYDROcodone-acetaminophen (NORCO) 7.5-325 mg per tablet Take 1 Tablet by mouth every eight (8) hours. Scheduled   Yes Provider, Historical   pregabalin (LYRICA) 150 mg capsule Take 150 mg by mouth nightly. Yes Provider, Historical   tiZANidine (ZANAFLEX) 4 mg tablet Take 4 mg by mouth two (2) times daily as needed for Muscle Spasm(s).    Yes Provider, Historical   oxyCODONE-acetaminophen (PERCOCET 7.5) 7.5-325 mg per tablet Take  by mouth every four (4) hours as needed for Pain. #42 for 7 days only 9/25/22 10/2/22 Yes Provider, Historical   ascorbic acid, vitamin C, (VITAMIN C) 500 mg tablet Take 500 mg by mouth daily. Yes Provider, Historical   calcium carbonate (OS-BREANA) 500 mg calcium (1,250 mg) tablet Take 1 Tablet by mouth daily. Yes Provider, Historical   ALPRAZolam (XANAX) 0.25 mg tablet Take 0.25 mg by mouth nightly as needed for Anxiety. Last filled 3/9/22 #10 for 30 days supply   Yes Provider, Historical   escitalopram oxalate (LEXAPRO) 10 mg tablet Take 10 mg by mouth daily. 4/7/22  Yes Provider, Historical   lidocaine (LIDODERM) 5 % Apply patch to the affected area for 12 hours a day and remove for 12 hours a day. 5/3/22  Yes Prosper Langston MD   lidocaine (XYLOCAINE) 5 % ointment Apply to affected area twice daily as needed for pain 5/3/22  Yes Prosper Langston MD     Allergies   Allergen Reactions    Gabapentin Unknown (comments)      Social History     Tobacco Use    Smoking status: Former     Packs/day: 0.50     Years: 10.00     Pack years: 5.00     Types: Cigars, Cigarettes    Smokeless tobacco: Current    Tobacco comments:     SMOKES CIGARS   Substance Use Topics    Alcohol use: Yes     Alcohol/week: 0.0 standard drinks     Comment: Occ      Family History   Problem Relation Age of Onset    Stroke Mother     Lung Disease Father     Bleeding Prob Father     Heart Disease Father     Hypertension Father     Anesth Problems Neg Hx         Current Facility-Administered Medications   Medication Dose Route Frequency    escitalopram oxalate (LEXAPRO) tablet 10 mg  10 mg Oral DAILY    HYDROcodone-acetaminophen (NORCO) 7.5-325 mg per tablet 1 Tablet  1 Tablet Oral Q8H    pregabalin (LYRICA) capsule 150 mg  150 mg Oral QHS       Review of Systems:  Review of systems not obtained due to patient factors.     Objective:   Vital Signs:    Visit Vitals  /72   Pulse 82   Temp 98.2 °F (36.8 °C)   Resp 14   Ht 5' 5\" (1.651 m)   Wt 98.4 kg (216 lb 14.4 oz)   LMP 2012   SpO2 95%   BMI 36.09 kg/m²       O2 Device: Nasal cannula   O2 Flow Rate (L/min): 4 l/min   Temp (24hrs), Av.3 °F (36.8 °C), Min:97.8 °F (36.6 °C), Max:98.7 °F (37.1 °C)       Intake/Output:   Last shift:      No intake/output data recorded. Last 3 shifts:  1901 -  0700  In: -   Out: 200 [Urine:200]    Intake/Output Summary (Last 24 hours) at 2022 1359  Last data filed at 2022 0636  Gross per 24 hour   Intake --   Output 200 ml   Net -200 ml        Physical Exam:   General:  Alert, cooperative, no distress, appears stated age. Head:  Normocephalic, without obvious abnormality, atraumatic. Eyes:  Conjunctivae/corneas clear. Lungs:   Clear to auscultation bilaterally. Chest wall:  No tenderness or deformity. Heart:  Regular rate and rhythm, S1, S2 normal, no murmur, click, rub or gallop. Abdomen:   Soft, non-tender. Bowel sounds normal. No masses,  No organomegaly. Extremities: Extremities normal, atraumatic, no cyanosis or edema. Pulses: 2+ and symmetric all extremities.                  Data review:     Recent Results (from the past 24 hour(s))   POC G3 - PUL    Collection Time: 22  3:21 PM   Result Value Ref Range    FIO2 (POC) 36 %    pH (POC) 7.36 7.35 - 7.45      pCO2 (POC) 46.6 (H) 35.0 - 45.0 MMHG    pO2 (POC) 67 (L) 80 - 100 MMHG    HCO3 (POC) 26.6 (H) 22 - 26 MMOL/L    sO2 (POC) 92.2 92 - 97 %    Base excess (POC) 0.8 mmol/L    Site LEFT RADIAL      Device: NASAL CANNULA      Set Rate 22 bpm    Allens test (POC) Positive      Specimen type (POC) ARTERIAL         Imaging:  I have personally reviewed the patients radiographs and have reviewed the reports:  CTA chest no PE but markedly elevated right rodrigo no ILD no ASD        Vaishnavi Woodward MD

## 2022-09-28 NOTE — PROGRESS NOTES
Physician Progress Note      JUANADacristina Iqbal  [facility] #:                  [id number]  :                       1975  ADMIT DATE:       2022 12:21 PM  [address] [place] DATE:  RESPONDING  PROVIDER #:        Chirag Godinez MD        QUERY TEXT:    Type of Anemia: Please provide further specificity, if known. Clinical indicators include:  Options provided:  -- Anemia due to acute blood loss  -- Anemia due to chronic blood loss  -- Anemia due to iron deficiency  -- Anemia due to postoperative blood loss  -- Anemia due to chronic disease  -- Other - I will add my own diagnosis  -- Disagree - Not applicable / Not valid  -- Disagree - Clinically Unable to determine / Unknown        PROVIDER RESPONSE TEXT:    Chronic anemia, unspecified.       Electronically signed by:  Chirag Godinez MD 2022 1:35 PM

## 2022-09-29 PROCEDURE — 74011250637 HC RX REV CODE- 250/637: Performed by: HOSPITALIST

## 2022-09-29 PROCEDURE — 65660000001 HC RM ICU INTERMED STEPDOWN

## 2022-09-29 RX ADMIN — OXYCODONE AND ACETAMINOPHEN 1 TABLET: 7.5; 325 TABLET ORAL at 08:10

## 2022-09-29 RX ADMIN — PREGABALIN 150 MG: 75 CAPSULE ORAL at 22:24

## 2022-09-29 RX ADMIN — HYDROCODONE BITARTRATE AND ACETAMINOPHEN 1 TABLET: 7.5; 325 TABLET ORAL at 15:20

## 2022-09-29 RX ADMIN — HYDROCODONE BITARTRATE AND ACETAMINOPHEN 1 TABLET: 7.5; 325 TABLET ORAL at 22:25

## 2022-09-29 RX ADMIN — ESCITALOPRAM OXALATE 10 MG: 10 TABLET ORAL at 08:10

## 2022-09-29 NOTE — PROGRESS NOTES
Pulmonary, Critical Care, and Sleep Medicine        Name: Amy Meraz MRN: [id number]   : 1975 [facility]   Date: 2022        IMPRESSION:   Acute hypoxic resp failure- suspect parlyzed right hemidiaph likely from cervical nerve root injury due to block used for right shoulder surgery. Anemia  Hiatal hernia  IBS      RECOMMENDATIONS:   CPAP tonight to prevent and treat further RLL ATX  PFTS as outpt; we discussed sleep study as well   Conservative mgmt at this point and hopefully right diaph will recover fxn. If not can consider other options- by thoracic- plication, pacing etc.   Follow up in 1 wk following discharge  We will be available again to see if needed      Subjective:   SNIFF test with limited movement of the right hemidiaphragm         Past Medical History:   Diagnosis Date    Anemia     Anemia     Anemia     Anemia     Anemia NEC     Blood transfusion     Bronchitis     Community acquired pneumonia     Gastritis     Gastrointestinal disorder     hiatal hernia, gastric ulcers    Hiatal hernia     IBS (irritable bowel syndrome)     Ill-defined condition     Anemia    Migraine     Sinus infection       Past Surgical History:   Procedure Laterality Date    HX HERNIA REPAIR      Nissen fundoplication    HX HERNIA REPAIR  2014    HX HYSTERECTOMY      HX KNEE ARTHROSCOPY      HX OTHER SURGICAL      noroplant removed from L arm      Prior to Admission medications    Medication Sig Start Date End Date Taking? Authorizing Provider   HYDROcodone-acetaminophen (NORCO) 7.5-325 mg per tablet Take 1 Tablet by mouth every eight (8) hours. Scheduled   Yes Provider, Historical   pregabalin (LYRICA) 150 mg capsule Take 150 mg by mouth nightly. Yes Provider, Historical   tiZANidine (ZANAFLEX) 4 mg tablet Take 4 mg by mouth two (2) times daily as needed for Muscle Spasm(s).    Yes Provider, Historical   oxyCODONE-acetaminophen (PERCOCET 7.5) 7.5-325 mg per tablet Take  by mouth every four (4) hours as needed for Pain. #42 for 7 days only 9/25/22 10/2/22 Yes Provider, Historical   ascorbic acid, vitamin C, (VITAMIN C) 500 mg tablet Take 500 mg by mouth daily. Yes Provider, Historical   calcium carbonate (OS-BREANA) 500 mg calcium (1,250 mg) tablet Take 1 Tablet by mouth daily. Yes Provider, Historical   ALPRAZolam (XANAX) 0.25 mg tablet Take 0.25 mg by mouth nightly as needed for Anxiety. Last filled 3/9/22 #10 for 30 days supply   Yes Provider, Historical   escitalopram oxalate (LEXAPRO) 10 mg tablet Take 10 mg by mouth daily. 4/7/22  Yes Provider, Historical   lidocaine (LIDODERM) 5 % Apply patch to the affected area for 12 hours a day and remove for 12 hours a day. 5/3/22  Yes Roseann Abarca MD   lidocaine (XYLOCAINE) 5 % ointment Apply to affected area twice daily as needed for pain 5/3/22  Yes Roseann Abarca MD     Allergies   Allergen Reactions    Gabapentin Unknown (comments)      Social History     Tobacco Use    Smoking status: Former     Packs/day: 0.50     Years: 10.00     Pack years: 5.00     Types: Cigars, Cigarettes    Smokeless tobacco: Current    Tobacco comments:     SMOKES CIGARS   Substance Use Topics    Alcohol use: Yes     Alcohol/week: 0.0 standard drinks     Comment: Occ      Family History   Problem Relation Age of Onset    Stroke Mother     Lung Disease Father     Bleeding Prob Father     Heart Disease Father     Hypertension Father     Anesth Problems Neg Hx         Current Facility-Administered Medications   Medication Dose Route Frequency    escitalopram oxalate (LEXAPRO) tablet 10 mg  10 mg Oral DAILY    HYDROcodone-acetaminophen (NORCO) 7.5-325 mg per tablet 1 Tablet  1 Tablet Oral Q8H    pregabalin (LYRICA) capsule 150 mg  150 mg Oral QHS       Review of Systems:  Review of systems not obtained due to patient factors.     Objective:   Vital Signs:    Visit Vitals  /62 (BP 1 Location: Left upper arm, BP Patient Position: At rest;Semi fowlers) Pulse 87   Temp 98.5 °F (36.9 °C)   Resp 16   Ht 5' 5\" (1.651 m)   Wt 99 kg (218 lb 3.2 oz)   LMP 2012   SpO2 98%   BMI 36.31 kg/m²       O2 Device: CPAP mask   O2 Flow Rate (L/min): 4 l/min   Temp (24hrs), Av.1 °F (36.7 °C), Min:97.8 °F (36.6 °C), Max:98.5 °F (36.9 °C)       Intake/Output:   Last shift:      No intake/output data recorded. Last 3 shifts:  1901 -  0700  In: -   Out: 600 [Urine:600]    Intake/Output Summary (Last 24 hours) at 2022 4561  Last data filed at 2022 1830  Gross per 24 hour   Intake --   Output 400 ml   Net -400 ml        Physical Exam:   General:  Alert, cooperative, no distress, appears stated age. Head:  Normocephalic, without obvious abnormality, atraumatic. Eyes:  Conjunctivae/corneas clear. Lungs:   Clear to auscultation bilaterally. Chest wall:  No tenderness or deformity. Heart:  Regular rate and rhythm, S1, S2 normal, no murmur, click, rub or gallop. Abdomen:   Soft, non-tender. Bowel sounds normal. No masses,  No organomegaly. Extremities: Extremities normal, atraumatic, no cyanosis or edema. Pulses: 2+ and symmetric all extremities. Data review:     No results found for this or any previous visit (from the past 24 hour(s)).       Imaging:  I have personally reviewed the patients radiographs and have reviewed the reports:  CTA chest no PE but markedly elevated right rodrigo no ILD no ASD        Tom Carmona, RAHEEM

## 2022-09-29 NOTE — PROGRESS NOTES
A Spiritual Care Partner Volunteer visited patient in Room 440 on 9/29/2022.   Documented by:  Chaplain Moore MDiv, MS, [facility]

## 2022-09-29 NOTE — PROGRESS NOTES
[address] [facility]  Hospitalist Group                                                                                          Hospitalist Progress Note  Stevo Valderrama MD  Answering service: [phone] from in house phone        Date of Service:  2022  NAME:  Marleen Ramires  :  1975  MRN:  [id number]      Admission Summary:   Marleen Ramires is a 48 y.o. female who had right shoulder repair yesterday came to the ED today for shortness of breath and hypoxia. Patient's other significant history is complicated abdominal hernia repair  a year ago. She is chronic smoker however never been diagnosed with COPD or has other pulmonary disease. Upon arrival in the ED SPO2 was 83% on room air so she was placed on 4 L of oxygen via nasal cannula. She was also tachycardic with heart rate of 121. Patient denied fever or chills or chest pain. No sick contact or exposure to COVID. CBC with normal WBC, neutrophilia. CMP unremarkable. NT proBNP 356 and at bedtime troponin under 4. CTA of the lung was negative for PE but showed significantly elevated right diaphragm with right lower lobe atelectasis. Interval history / Subjective:   Patient seen and examined earlier this morning by me for follow up of respiratory failure. Reports right shoulder pain but denies other acute issues. Discussed her condition with her and her sister who was on the phone. Assessment & Plan:     Acute hypoxic respiratory failure most probably due to right diaphragmatic paralysis. --Continue  NPPV. --Continue supplemental oxygen to keep SPO2 above 94%. --Pulmonary has evaluated, input appreciated. fluoroscopic sniff test demonstrated minimal movement of the right hemidiaphragm. --No indication to continue antibiotics. --Rapid COVID test is negative. --Discussed with pulmonary, recommended C-spine MRI, ordered.   - no changes  Weaned down to 2L  Continue to wean  Continue current management     Chronic pain syndrome: Patient had extensive complicated fundoplication and hernia repair with resultant chronic abdominal pain for which she is under the care of pain specialist.  Home regimen continued. Shoulder impingement syndrome, right  --Status post recent repair  --sent [email or web address] PS message to her orhto surgeon Dr Eric Jacobs in case he wants to see her while inpatient      Chronic mild anemia,unspecified. --Check iron/ferritin,folate,B12     Code status: full  Prophylaxis: SCD  Care Plan discussed with: patient, nurse, family  Anticipated Disposition: home 24-48 hours     Hospital Problems  Date Reviewed: 9/9/2022            Codes Class Noted POA    Acute respiratory failure with hypoxia [facility]) ICD-10-CM: J96.01  ICD-9-CM: 518.81  9/27/2022 Unknown             Review of Systems:   A comprehensive review of systems was negative except for that written in the HPI. Vital Signs:    Last 24hrs VS reviewed since prior progress note. Most recent are:  Visit Vitals  BP (!) 110/52 (BP 1 Location: Left upper arm, BP Patient Position: At rest;Sitting)   Pulse 94   Temp 98.5 °F (36.9 °C)   Resp 14   Ht 5' 5\" (1.651 m)   Wt 99 kg (218 lb 3.2 oz)   SpO2 95%   BMI 36.31 kg/m²         Intake/Output Summary (Last 24 hours) at 9/29/2022 1536  Last data filed at 9/28/2022 1830  Gross per 24 hour   Intake --   Output 400 ml   Net -400 ml        Physical Examination:     I had a face to face encounter with this patient and independently examined them on 9/29/2022 as outlined below:          Constitutional:  No acute distress, cooperative, pleasant    ENT:  Oral mucosa moist, oropharynx benign. Resp:  CTA bilaterally. No wheezing/rhonchi/rales. No accessory muscle use. CV:  Regular rhythm, normal rate, no murmurs, gallops, rubs    GI:  Soft, non distended, non tender.  normoactive bowel sounds, no hepatosplenomegaly     Musculoskeletal:  No edema, warm, 2+ pulses throughout    Neurologic:  Moves all extremities. AAOx3, CN II-XII reviewed            Data Review:    Review and/or order of clinical lab test  Review and/or order of tests in the radiology section of CPT  Review and/or order of tests in the medicine section of CPT      Labs:     Recent Labs     09/27/22  1107   WBC 11.0   HGB 10.7*   HCT 34.6*   *     Recent Labs     09/27/22  1107      K 4.0      CO2 27   BUN 14   CREA 0.78   *   CA 9.3     Recent Labs     09/27/22  1107   ALT 43      TBILI 0.2   TP 8.2   ALB 3.1*   GLOB 5.1*     No results for input(s): INR, PTP, APTT, INREXT in the last 72 hours. No results for input(s): FE, TIBC, PSAT, FERR in the last 72 hours. Lab Results   Component Value Date/Time    Folate 12.5 11/03/2016 08:30 AM      No results for input(s): PH, PCO2, PO2 in the last 72 hours. No results for input(s): CPK, CKNDX, TROIQ in the last 72 hours.     No lab exists for component: CPKMB  Lab Results   Component Value Date/Time    Cholesterol, total 160 11/03/2016 08:30 AM    HDL Cholesterol 40 11/03/2016 08:30 AM    LDL, calculated 98 11/03/2016 08:30 AM    Triglyceride 112 11/03/2016 08:30 AM     No results found for: [facility]  Lab Results   Component Value Date/Time    Color YELLOW/STRAW 04/23/2019 11:47 AM    Appearance CLEAR 04/23/2019 11:47 AM    Specific gravity 1.022 04/23/2019 11:47 AM    Specific gravity >1.030 (H) 02/09/2014 01:28 PM    pH (UA) 6.0 04/23/2019 11:47 AM    Protein NEGATIVE 04/23/2019 11:47 AM    Glucose NEGATIVE 04/23/2019 11:47 AM    Ketone NEGATIVE 04/23/2019 11:47 AM    Bilirubin NEGATIVE 04/23/2019 11:47 AM    Urobilinogen 1.0 04/23/2019 11:47 AM    Nitrites NEGATIVE 04/23/2019 11:47 AM    Leukocyte Esterase NEGATIVE 04/23/2019 11:47 AM    Epithelial cells FEW 04/23/2019 11:47 AM    Bacteria NEGATIVE 04/23/2019 11:47 AM    WBC 0-4 04/23/2019 11:47 AM    RBC 10-20 04/23/2019 11:47 AM         Medications Reviewed:     Current Facility-Administered Medications Medication Dose Route Frequency    ALPRAZolam (XANAX) tablet 0.25 mg  0.25 mg Oral QHS PRN    escitalopram oxalate (LEXAPRO) tablet 10 mg  10 mg Oral DAILY    HYDROcodone-acetaminophen (NORCO) 7.5-325 mg per tablet 1 Tablet  1 Tablet Oral Q8H    oxyCODONE-acetaminophen (PERCOCET 7.5) 7.5-325 mg per tablet 1 Tablet  1 Tablet Oral Q4H PRN    pregabalin (LYRICA) capsule 150 mg  150 mg Oral QHS    tiZANidine (ZANAFLEX) tablet 4 mg  4 mg Oral BID PRN    naloxone (NARCAN) injection 0.4 mg  0.4 mg IntraVENous EVERY 2 MINUTES AS NEEDED     ______________________________________________________________________  EXPECTED LENGTH OF STAY: 3d 12h  ACTUAL LENGTH OF STAY:          2                 Serene Rodriguez MD

## 2022-09-30 VITALS
WEIGHT: 220.24 LBS | TEMPERATURE: 97.9 F | BODY MASS INDEX: 36.69 KG/M2 | HEART RATE: 92 BPM | OXYGEN SATURATION: 96 % | SYSTOLIC BLOOD PRESSURE: 114 MMHG | HEIGHT: 65 IN | DIASTOLIC BLOOD PRESSURE: 60 MMHG | RESPIRATION RATE: 16 BRPM

## 2022-09-30 PROBLEM — J96.01 ACUTE RESPIRATORY FAILURE WITH HYPOXIA (HCC): Status: RESOLVED | Noted: 2022-09-27 | Resolved: 2022-09-30

## 2022-09-30 PROCEDURE — 97165 OT EVAL LOW COMPLEX 30 MIN: CPT

## 2022-09-30 PROCEDURE — 97535 SELF CARE MNGMENT TRAINING: CPT

## 2022-09-30 PROCEDURE — 97116 GAIT TRAINING THERAPY: CPT

## 2022-09-30 PROCEDURE — 97161 PT EVAL LOW COMPLEX 20 MIN: CPT

## 2022-09-30 PROCEDURE — 74011250637 HC RX REV CODE- 250/637: Performed by: HOSPITALIST

## 2022-09-30 RX ADMIN — HYDROCODONE BITARTRATE AND ACETAMINOPHEN 1 TABLET: 7.5; 325 TABLET ORAL at 05:31

## 2022-09-30 RX ADMIN — ESCITALOPRAM OXALATE 10 MG: 10 TABLET ORAL at 10:10

## 2022-09-30 RX ADMIN — HYDROCODONE BITARTRATE AND ACETAMINOPHEN 1 TABLET: 7.5; 325 TABLET ORAL at 13:14

## 2022-09-30 NOTE — PROGRESS NOTES
PHYSICAL THERAPY EVALUATION/DISCHARGE  Patient: [facility] (89 y.o. female)  Date: 9/30/2022  Primary Diagnosis: Acute respiratory failure with hypoxia (HCC) [J96.01]       Precautions:   low fall risk per Carson,  (RUE in sling s/p shoulder surgery just prior to admission)      ASSESSMENT  Based on the objective data described below, the patient presents with no need for assist for transfers, amb nor stairs management. She was admitted with acute respiratory failure having undergone right shoulder surgery the day prior to admission. She was received on supplemental 02 but was able to remain on room air during amb and while negotiating 4 stairs using just pursed lip breathing to maintain Sp02 in the 90s. I provided her with an incentive spirometer and she demonstrated excellent technique in its use. I educated her about activity pacing and she verbalized understanding. I can clear her for discharge to home at this time, with no demonstrated need for supplemental 02. Functional Outcome Measure: The patient scored 25/28 on the Tinetti outcome measure which is indicative of low fall risk. .      Other factors to consider for discharge: two story home but reports she plans to sleep in the first level. Further skilled acute physical therapy is not indicated at this time. PLAN :  Recommendation for discharge: (in order for the patient to meet his/her long term goals)  No skilled physical therapy/ follow up rehabilitation needs identified at this time. This discharge recommendation:  A follow-up discussion with the attending provider and/or case management is planned    IF patient discharges home will need the following DME: none       SUBJECTIVE:   Patient stated that she was hopeful to go home soon.     OBJECTIVE DATA SUMMARY:   Consult received, chart reviewed, pt cleared by nursing  HISTORY:    Past Medical History:   Diagnosis Date    Anemia     Anemia     Anemia     Anemia     Anemia NEC     Blood transfusion     Bronchitis     Community acquired pneumonia     Gastritis     Gastrointestinal disorder     hiatal hernia, gastric ulcers    Hiatal hernia     IBS (irritable bowel syndrome)     Ill-defined condition     Anemia    Migraine     Sinus infection      Past Surgical History:   Procedure Laterality Date    HX HERNIA REPAIR  2013    Nissen fundoplication    HX HERNIA REPAIR  7/24/2014    HX HYSTERECTOMY  2012    HX KNEE ARTHROSCOPY      HX OTHER SURGICAL      noroplant removed from L arm       Prior level of function: independent. Personal factors and/or comorbidities impacting plan of care: underwent right shoulder repair on 9/27/22    Home Situation  Home Environment: Private residence  # Steps to Enter: 4  Rails to Enter: Yes  Hand Rails : Bilateral  One/Two Story Residence: Two story, live on 1st floor  Living Alone: No  Support Systems: Child(francisco), Parent(s)  Patient Expects to be Discharged to[de-identified] Home  Current DME Used/Available at Home:  (sling for RUE)  Tub or Shower Type: Tub/Shower combination    EXAMINATION/PRESENTATION/DECISION MAKING:   Critical Behavior:  Neurologic State: Alert  Orientation Level: Appropriate for age  Cognition: Follows commands, Appropriate decision making  Safety/Judgement: Awareness of environment, Fall prevention  Hearing: Auditory  Auditory Impairment: None  Skin:  refer to MD and nursing notes  Edema: refer to MD and nursing notes  Range Of Motion:  AROM: Within functional limits LEs           PROM:  (RUE limited 2/2 recent shoulder sx 9/26)           Strength:    Strength:  Within functional limits LEs                  Tone & Sensation:   Tone: Normal              Sensation: Intact               Coordination:  Coordination: Within functional limits  Vision:   Tracking: Able to track stimulus in all quadrants w/o difficulty  Diplopia: No  Acuity: Within Defined Limits  Corrective Lenses: Glasses  Functional Mobility:  Bed Mobility:         Not assessed Transfers:  Sit to Stand: Independent (Simultaneous filing. User may not have seen previous data.)  Stand to Sit: Independent (Simultaneous filing. User may not have seen previous data.)                       Balance:   Sitting: Intact (Simultaneous filing. User may not have seen previous data.)  Standing: Intact (Simultaneous filing. User may not have seen previous data.)  Ambulation/Gait Training:  Distance (ft): 125 Feet (ft)  Assistive Device: Gait belt (RUE sling)  Ambulation - Level of Assistance: Contact guard assistance        Gait Abnormalities: Decreased step clearance        Base of Support: Widened     Speed/Nae: Slow                        Stairs:  Number of Stairs Trained: 4  Stairs - Level of Assistance: Contact guard assistance;Assist X1   Rail Use: Left     Therapeutic Exercises:   Use of incentive spirometer     Functional Measure:  Tinetti test:    Sitting Balance: 1  Arises: 1  Attempts to Rise: 2  Immediate Standing Balance: 2  Standing Balance: 2  Nudged: 2  Eyes Closed: 1  Turn 360 Degrees - Continuous/Discontinuous: 1  Turn 360 Degrees - Steady/Unsteady: 1  Sitting Down: 1  Balance Score: 14 Balance total score  Indication of Gait: 1  R Step Length/Height: 1  L Step Length/Height: 1  R Foot Clearance: 1  L Foot Clearance: 1  Step Symmetry: 1  Step Continuity: 1  Path: 2  Trunk: 2  Walking Time: 0  Gait Score: 11 Gait total score  Total Score: 25/28 Overall total score         Tinetti Tool Score Risk of Falls  <19 = High Fall Risk  19-24 = Moderate Fall Risk  25-28 = Low Fall Risk  Tinetti ME. Performance-Oriented Assessment of Mobility Problems in Elderly Patients. [address] 64; [id number].  (Scoring Description: PT Bulletin Feb. 10, 1993)    Older adults: Guerline Alvarado et al, 2009; n = [address] elderly evaluated with ABC, KATTY, ADL, and IADL)  · Mean KATTY score for males aged 69-68 years = 26.21(3.40)  · Mean KATTY score for females age 69-68 years = 25.16(4.30)  · Mean KATTY score for males over 80 years = 23.29(6.02)  · Mean KATTY score for females over 80 years = 17.20(8.32)           Physical Therapy Evaluation Charge Determination   History Examination Presentation Decision-Making   MEDIUM  Complexity : 1-2 comorbidities / personal factors will impact the outcome/ POC  LOW Complexity : 1-2 Standardized tests and measures addressing body structure, function, activity limitation and / or participation in recreation  LOW Complexity : Stable, uncomplicated  LOW Complexity : FOTO score of       Based on the above components, the patient evaluation is determined to be of the following complexity level: LOW     Pain Rating:  None rated but reported continued pain right shoulder (placed ice packs on it)     Activity Tolerance:   SpO2 stable on RA and see assessment      After treatment patient left in no apparent distress:   Sitting in chair and Call bell within reach    COMMUNICATION/EDUCATION:   The patients plan of care was discussed with: Occupational therapist and Registered nurse. Fall prevention education was provided and the patient/caregiver indicated understanding.     Thank you for this referral.  Cherylann Life   Time Calculation: 27 mins

## 2022-09-30 NOTE — PROGRESS NOTES
Problem: General Medical Care Plan  Goal: *Vital signs within specified parameters  Outcome: Progressing Towards Goal  Goal: *Labs within defined limits  Outcome: Progressing Towards Goal  Goal: *Absence of infection signs and symptoms  Outcome: Progressing Towards Goal  Goal: *Optimal pain control at patient's stated goal  Outcome: Progressing Towards Goal  Goal: *Skin integrity maintained  Outcome: Progressing Towards Goal  Goal: *Fluid volume balance  Outcome: Progressing Towards Goal  Goal: *Optimize nutritional status  Outcome: Progressing Towards Goal  Goal: *Anxiety reduced or absent  Outcome: Progressing Towards Goal  Goal: *Progressive mobility and function (eg: ADL's)  Outcome: Progressing Towards Goal     Problem: Patient Education: Go to Patient Education Activity  Goal: Patient/Family Education  Outcome: Progressing Towards Goal     Problem: Falls - Risk of  Goal: *Absence of Falls  Description: Document Hu Fall Risk and appropriate interventions in the flowsheet.   Outcome: Progressing Towards Goal  Note: Fall Risk Interventions:            Medication Interventions: Evaluate medications/consider consulting pharmacy    Elimination Interventions: Call light in reach

## 2022-09-30 NOTE — PROGRESS NOTES
Problem: General Medical Care Plan  Goal: *Vital signs within specified parameters  Outcome: Progressing Towards Goal  Goal: *Labs within defined limits  Outcome: Progressing Towards Goal  Goal: *Absence of infection signs and symptoms  Outcome: Progressing Towards Goal  Goal: *Optimal pain control at patient's stated goal  Outcome: Progressing Towards Goal  Goal: *Skin integrity maintained  Outcome: Progressing Towards Goal  Goal: *Fluid volume balance  Outcome: Progressing Towards Goal  Goal: *Optimize nutritional status  Outcome: Progressing Towards Goal  Goal: *Anxiety reduced or absent  Outcome: Progressing Towards Goal  Goal: *Progressive mobility and function (eg: ADL's)  Outcome: Progressing Towards Goal     Problem: Patient Education: Go to Patient Education Activity  Goal: Patient/Family Education  Outcome: Progressing Towards Goal     Problem: Falls - Risk of  Goal: *Absence of Falls  Description: Document Hu Fall Risk and appropriate interventions in the flowsheet.   Outcome: Progressing Towards Goal  Note: Fall Risk Interventions:            Medication Interventions: Assess postural VS orthostatic hypotension, Bed/chair exit alarm, Patient to call before getting OOB    Elimination Interventions: Call light in reach              Problem: Patient Education: Go to Patient Education Activity  Goal: Patient/Family Education  Outcome: Progressing Towards Goal

## 2022-09-30 NOTE — PROGRESS NOTES
Bedside and Verbal shift change report given to Johann Mcintosh (oncoming nurse) by Bridgett Harvey and Jayne John (offgoing nurse).  Report included the following information SBAR, Kardex, ED Summary, Procedure Summary, Intake/Output, MAR, Recent Results, and Cardiac Rhythm SR-ST .

## 2022-09-30 NOTE — DISCHARGE SUMMARY
Discharge Summary       PATIENT ID: Korene Dakin  MRN: [id number]   YOB: 1975    DATE OF ADMISSION: 9/27/2022 12:21 PM    DATE OF DISCHARGE: 9/30/22   PRIMARY CARE PROVIDER: None     ATTENDING PHYSICIAN: Lauren Howard MD  DISCHARGING PROVIDER: Lauren Howard MD    To contact this individual call [phone] and ask the  to page. If unavailable ask to be transferred the Adult Hospitalist Department. CONSULTATIONS: IP CONSULT TO PULMONOLOGY    PROCEDURES/SURGERIES: * No surgery found *    ADMITTING DIAGNOSES & HOSPITAL COURSE:   Korene Dakin is a 48 y.o. female who had right shoulder repair yesterday came to the ED today for shortness of breath and hypoxia. Patient's other significant history is complicated abdominal hernia repair  a year ago. She is chronic smoker however never been diagnosed with COPD or other pulmonary disease. Upon arrival in the ED SPO2 was 83% on room air so she was placed on 4 L of oxygen via nasal cannula. She was also tachycardic with heart rate of 121. Patient denied fever or chills or chest pain. No sick contact or exposure to COVID. CBC with normal WBC, neutrophilia. CMP unremarkable. NT proBNP 356 and at bedtime troponin under 4. CTA of the lung was negative for PE but showed significantly elevated right diaphragm with right lower lobe atelectasis. I have reviewed the CT imaging personally. I am concerned about diaphoretic paralysis due to phrenic nerve injury given the occurrence of her symptoms surrounding recent shoulder repair and I have placed an emergent pulmonary consult. Acute hypoxic respiratory failure most probably due to right diaphragmatic paralysis. febrile. Patient does not have significant cough nor is she febrile. WBC normal, the airspace disease mentioned on CT is most probably due to atelectasis. As such no evidence of pneumonia. --Admit patient to the [facility] as she may need an PPV.   --Continue supplemental oxygen to keep SPO2 above 94%. --Emergent consulted pulmonary. Pulmonary has evaluated, input appreciated. --No indication to continue antibiotics. --Rapid COVID test is negative. Chronic pain syndrome: Patient had extensive complicated fundoplication and hernia repair with resultant chronic abdominal pain for which she is under the care of pain specialist.  Home regimen continued. Hospital course  Patient was given supplemental O2. Pulmonary saw the patient. 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 She was weaned off of O2 and discharged to follow up with Pulmonology as an outpatient. DISCHARGE DIAGNOSES / PLAN:       Acute hypoxic respiratory failure due to right diaphragmatic paralysis. Weaned off of O2. Follow up with Pulmonology. Shoulder impingement syndrome, right  Chronic pain syndrome. Continue home meds. PENDING TEST RESULTS:   At the time of discharge the following test results are still pending: none    FOLLOW UP APPOINTMENTS:    Follow-up Information       Follow up With Specialties Details Why Contact Info    Raissa Tirado MD Orthopaedic Surgery Sports Medicine Physician Call Call to schedule follow up for shoulder. [address] [facility]      Yassine Awad MD Pulmonary Disease Call Call to schedule follow up of lung function. [address]  [phone]      None    None ([phone]) Patient stated that they have no PCP               ADDITIONAL CARE RECOMMENDATIONS: noen    DIET: Regular Diet    ACTIVITY: Activity as tolerated    WOUND CARE: none    EQUIPMENT needed: none      DISCHARGE MEDICATIONS:  Current Discharge Medication List        CONTINUE these medications which have NOT CHANGED    Details   HYDROcodone-acetaminophen (NORCO) 7.5-325 mg per tablet Take 1 Tablet by mouth every eight (8) hours.  Scheduled      pregabalin (LYRICA) 150 mg capsule Take 150 mg by mouth nightly. tiZANidine (ZANAFLEX) 4 mg tablet Take 4 mg by mouth two (2) times daily as needed for Muscle Spasm(s). oxyCODONE-acetaminophen (PERCOCET 7.5) 7.5-325 mg per tablet Take  by mouth every four (4) hours as needed for Pain. #42 for 7 days only      ascorbic acid, vitamin C, (VITAMIN C) 500 mg tablet Take 500 mg by mouth daily. calcium carbonate (OS-BREANA) 500 mg calcium (1,250 mg) tablet Take 1 Tablet by mouth daily. ALPRAZolam (XANAX) 0.25 mg tablet Take 0.25 mg by mouth nightly as needed for Anxiety. Last filled 3/9/22 #10 for 30 days supply      escitalopram oxalate (LEXAPRO) 10 mg tablet Take 10 mg by mouth daily. lidocaine (LIDODERM) 5 % Apply patch to the affected area for 12 hours a day and remove for 12 hours a day. Qty: 30 Each, Refills: 2    Associated Diagnoses: Impingement syndrome of shoulder, right      lidocaine (XYLOCAINE) 5 % ointment Apply to affected area twice daily as needed for pain  Qty: 100 g, Refills: 2    Associated Diagnoses: Impingement syndrome of shoulder, right               NOTIFY YOUR PHYSICIAN FOR ANY OF THE FOLLOWING:   Fever over 101 degrees for 24 hours. Chest pain, shortness of breath, fever, chills, nausea, vomiting, diarrhea, change in mentation, falling, weakness, bleeding. Severe pain or pain not relieved by medications. Or, any other signs or symptoms that you may have questions about. DISPOSITION:   X Home With:   OT  PT  HH  RN       Long term SNF/Inpatient Rehab    Independent/assisted living    Hospice    Other:       PATIENT CONDITION AT DISCHARGE:     Functional status    Poor     Deconditioned    X Independent      Cognition   X  Lucid     Forgetful     Dementia      Catheters/lines (plus indication)    Cohn     PICC     PEG    X None      Code status    X Full code     DNR      PHYSICAL EXAMINATION AT DISCHARGE:  Constitutional:  No acute distress, cooperative, pleasant    ENT:  Oral mucosa moist, oropharynx benign. Resp: CTA bilaterally. No wheezing/rhonchi/rales. No accessory muscle use. CV:  Regular rhythm, normal rate, no murmurs, gallops, rubs    GI:  Soft, non distended, non tender. normoactive bowel sounds, no hepatosplenomegaly     Musculoskeletal:  No edema, warm, 2+ pulses throughout    Neurologic:  Moves all extremities.   AAOx3, CN II-XII reviewed     CHRONIC MEDICAL DIAGNOSES:  Problem List as of 9/30/2022 Date Reviewed: 9/9/2022            Codes Class Noted - Resolved    Impingement syndrome of shoulder, right ICD-10-CM: M75.41  ICD-9-CM: 726.2  5/2/2022 - Present        Chronic anemia ICD-10-CM: D64.9  ICD-9-CM: 285.9  11/1/2016 - Present        Seasonal allergic rhinitis ICD-10-CM: J30.2  ICD-9-CM: 477.9  11/1/2016 - Present        Fatigue ICD-10-CM: R53.83  ICD-9-CM: 780.79  11/1/2016 - Present        Patellofemoral stress syndrome of both knees ICD-10-CM: M22.2X1, M22.2X2  ICD-9-CM: 719.46  2/15/2016 - Present        PUD (peptic ulcer disease) ICD-10-CM: K27.9  ICD-9-CM: 533.90  2/15/2016 - Present        Right knee pain ICD-10-CM: M25.561  ICD-9-CM: 719.46  3/27/2015 - Present        Insomnia ICD-10-CM: G47.00  ICD-9-CM: 780.52  3/27/2015 - Present        AR (allergic rhinitis) ICD-10-CM: J30.9  ICD-9-CM: 477.9  11/4/2014 - Present        Chronic mixed headache syndrome ICD-10-CM: G44.89  ICD-9-CM: 339.89  10/7/2014 - Present        Chronic LBP ICD-10-CM: M54.50, G89.29  ICD-9-CM: 724.2, 338.29  10/7/2014 - Present        Obesity ICD-10-CM: E66.9  ICD-9-CM: 278.00  10/7/2014 - Present        S/P hysterectomy ICD-10-CM: Z90.710  ICD-9-CM: V88.01  10/7/2014 - Present        History of hiatal hernia ICD-10-CM: Z87.19  ICD-9-CM: V12.79  10/7/2014 - Present        History of umbilical hernia repair DVX-40-LB: Z98.890, Z87.19  ICD-9-CM: V15.29  10/7/2014 - Present        RESOLVED: [facility] ([facility] ICD-10-CM: J96.01  ICD-9-CM: 518.81  9/27/2022 - 9/30/2022           Greater than 30 minutes were spent with the patient on counseling and coordination of care    Signed:   Lauren Howard MD  9/30/2022  3:28 PM

## 2022-09-30 NOTE — PROGRESS NOTES
Problem: General Medical Care Plan  Goal: *Vital signs within specified parameters  9/30/2022 1638 by Cherrie Reina RN  Outcome: Resolved/Met  9/30/2022 1508 by Cherrie Reina RN  Outcome: Progressing Towards Goal  Goal: *Labs within defined limits  9/30/2022 1638 by Cherrie Reina RN  Outcome: Resolved/Met  9/30/2022 1508 by Cherrie Reina RN  Outcome: Progressing Towards Goal  Goal: *Absence of infection signs and symptoms  9/30/2022 1638 by Cherrie Reina RN  Outcome: Resolved/Met  9/30/2022 1508 by Cherrie Reina RN  Outcome: Progressing Towards Goal  Goal: *Optimal pain control at patient's stated goal  9/30/2022 1638 by Cherrie Reina RN  Outcome: Resolved/Met  9/30/2022 1508 by Cherrie Reina RN  Outcome: Progressing Towards Goal  Goal: *Skin integrity maintained  9/30/2022 1638 by Cherrie Reina RN  Outcome: Resolved/Met  9/30/2022 1508 by Cherrie Reina RN  Outcome: Progressing Towards Goal  Goal: *Fluid volume balance  9/30/2022 1638 by Cherrie Reina RN  Outcome: Resolved/Met  9/30/2022 1508 by Cherrie Reina RN  Outcome: Progressing Towards Goal  Goal: *Optimize nutritional status  9/30/2022 1638 by Cherrie Reina RN  Outcome: Resolved/Met  9/30/2022 1508 by Cherrie Reina RN  Outcome: Progressing Towards Goal  Goal: *Anxiety reduced or absent  9/30/2022 1638 by Cherrie Reina RN  Outcome: Resolved/Met  9/30/2022 1508 by Cherrie Reina RN  Outcome: Progressing Towards Goal  Goal: *Progressive mobility and function (eg: ADL's)  9/30/2022 1638 by Cherrie Reina RN  Outcome: Resolved/Met  9/30/2022 1508 by Cherrie Reina RN  Outcome: Progressing Towards Goal     Problem: Patient Education: Go to Patient Education Activity  Goal: Patient/Family Education  9/30/2022 1638 by Cherrie Reina RN  Outcome: Resolved/Met  9/30/2022 1508 by Cherrie Reina RN  Outcome: Progressing Towards Goal     Problem: Falls - Risk of  Goal: *Absence of Falls  Description: Document Shandra Skill Fall Risk and appropriate interventions in the flowsheet.   9/30/2022 1638 by Jill Singh RN  Outcome: Resolved/Met  Note: Fall Risk Interventions:            Medication Interventions: Assess postural VS orthostatic hypotension, Bed/chair exit alarm, Patient to call before getting OOB    Elimination Interventions: Call light in reach           9/30/2022 1508 by Jill Singh RN  Outcome: Progressing Towards Goal  Note: Fall Risk Interventions:            Medication Interventions: Assess postural VS orthostatic hypotension, Bed/chair exit alarm, Patient to call before getting OOB    Elimination Interventions: Call light in reach              Problem: Patient Education: Go to Patient Education Activity  Goal: Patient/Family Education  9/30/2022 1638 by Jill Singh RN  Outcome: Resolved/Met  9/30/2022 1508 by Jill Singh RN  Outcome: Progressing Towards Goal

## 2022-09-30 NOTE — PROGRESS NOTES
Orders received, chart reviewed and patient evaluated by occupational therapy. Pending progression with skilled acute occupational therapy, recommend:  No skilled occupational therapy/ follow up rehabilitation needs identified at this time. Recommend with nursing ADLs with supervision/setup, OOB to chair 3x/day and toileting via functional mobility to and from bathroom with 1 assist. Thank you for completing as able in order to maintain patient strength, endurance and independence. Of note, while exerting herself during mobility, SPO2 dropping to 88% on RA - recovers within 4-5 focused breaths using PLB technique to 94-95%. Full evaluation to follow.

## 2022-09-30 NOTE — PROGRESS NOTES
OCCUPATIONAL THERAPY EVALUATION/DISCHARGE  Patient: Maura Pritchett (11 y.o. female)  Date: 9/30/2022  Primary Diagnosis: Acute respiratory failure with hypoxia ([facility] [address] [J96.01]       Precautions:  Fall (RUE in sling s/p whoulder surgery just prior to admission)    ASSESSMENT  Based on the objective data described below, the patient presents with near baseline ADL performance s/p admission for acute respiratory failure with hypoxia. Patient ADLs mildly limited by impaired balance and decreased cardiopulmonary endurance. Patient with recent hx of R shoulder sx on 9/26. Patient IND with ADLs and mobility PTA. Today, patient received in chair and agreeable to therapy. Patient IND with donning pants and supervision for functional mobility in room and on unit. Patient educated on PLB  - SPO2 dropping to 88% with exertion on RA but with cues for PLB, returned to 95% within 4-5 focused breaths. Patient left with PT at end of session. Patient cleared to go home with no follow-up OT needs. Will sign off. Current Level of Function (ADLs/self-care): IND with ADLs    Functional Outcome Measure: The patient scored 95/100 on the Barthel Index outcome measure which is indicative of 5% impairment in self care tasks. Other factors to consider for discharge: none     PLAN :  Recommend with staff: Recommend with nursing, ADLs with supervision/setup, OOB to chair 3x/day and toileting via functional mobility to and from bathroom. Thank you for completing as able in order to maintain patient strength, endurance and independence. Recommendation for discharge: (in order for the patient to meet his/her long term goals)  No skilled occupational therapy/ follow up rehabilitation needs identified at this time.     This discharge recommendation:  Has been made in collaboration with the attending provider and/or case management    IF patient discharges home will need the following DME: none       SUBJECTIVE:   Patient stated Antionette Chirinos didn't really give me any instructions for this shoulder.  re: post sx restrictions    OBJECTIVE DATA SUMMARY:   HISTORY:   Past Medical History:   Diagnosis Date    Anemia     Anemia     Anemia     Anemia     Anemia NEC     Blood transfusion     Bronchitis     Community acquired pneumonia     Gastritis     Gastrointestinal disorder     hiatal hernia, gastric ulcers    Hiatal hernia     IBS (irritable bowel syndrome)     Ill-defined condition     Anemia    Migraine     Sinus infection      Past Surgical History:   Procedure Laterality Date    HX HERNIA REPAIR  2013    Nissen fundoplication    HX HERNIA REPAIR  7/24/2014    HX HYSTERECTOMY  2012    HX KNEE ARTHROSCOPY      HX OTHER SURGICAL      noroplant removed from L arm       Prior Level of Function/Environment/Context: lives with family in 2 level home - able to stay on 1st floor as needed. Patient IND and driving PTA. No DME used at baseline  Expanded or extensive additional review of patient history:   Home Situation  Home Environment: Private residence  # Steps to Enter: 4  Rails to Enter: Yes  Hand Rails : Bilateral  One/Two Story Residence: Two story, live on 1st floor  Living Alone: No  Support Systems: Child(francisco), Parent(s)  Patient Expects to be Discharged to[de-identified] Home  Current DME Used/Available at Home:  (sling for RUE)  Tub or Shower Type: Tub/Shower combination    Hand dominance: Right    EXAMINATION OF PERFORMANCE DEFICITS:  Cognitive/Behavioral Status:  Neurologic State: Alert  Orientation Level: Appropriate for age  Cognition: Follows commands; Appropriate decision making  Perception: Appears intact  Perseveration: No perseveration noted  Safety/Judgement: Awareness of environment; Fall prevention    Skin: appears grossly intact    Edema: none noted in BUEs    Hearing:   Auditory  Auditory Impairment: None    Vision/Perceptual:    Tracking: Able to track stimulus in all quadrants w/o difficulty    Diplopia: No    Acuity: Within Defined Limits Corrective Lenses: Glasses    Range of Motion:  In BUEs  AROM: Within functional limits   PROM:  (RUE limited / recent shoulder sx )    Strength: In BUEs  Strength: Within functional limits    Coordination:  Coordination: Within functional limits  Fine Motor Skills-Upper: Left Intact; Right Intact    Gross Motor Skills-Upper: Left Intact; Right Intact    Tone & Sensation:  In BUEs  Tone: Normal  Sensation: Intact     Balance:  Sitting: Intact   Standing: Intact     Functional Mobility and Transfers for ADLs:  Bed Mobility:   NT this session    Transfers:  Sit to Stand: Independent   Stand to Sit: Independent     ADL Assessment:  Feeding: Independent    Oral Facial Hygiene/Grooming: Independent    Bathing: Independent    Type of Bath: Chlorhexidine (CHG)    Upper Body Dressing: Independent    Lower Body Dressing: Independent    Toileting: Independent    ADL Intervention and task modifications:  Feeding  Drink to Mouth: Independent    Lower Body Dressing Assistance  Pants With Elastic Waist: Independent  Leg Crossed Method Used: No  Position Performed: Seated in chair    Cognitive Retraining  Safety/Judgement: Awareness of environment; Fall prevention    Functional Measure:    Barthel Index:  Bathin  Bladder: 10  Bowels: 10  Groomin  Dressing: 10  Feeding: 10  Mobility: 10  Stairs: 10  Toilet Use: 10  Transfer (Bed to Chair and Back): 15  Total: 95/100      The Barthel ADL Index: Guidelines  1. The index should be used as a record of what a patient does, not as a record of what a patient could do. 2. The main aim is to establish degree of independence from any help, physical or verbal, however minor and for whatever reason. 3. The need for supervision renders the patient not independent. 4. A patient's performance should be established using the best available evidence. Asking the patient, friends/relatives and nurses are the usual sources, but direct observation and common sense are also important. However direct testing is not needed. 5. Usually the patient's performance over the preceding 24-48 hours is important, but occasionally longer periods will be relevant. 6. Middle categories imply that the patient supplies over 50 per cent of the effort. 7. Use of aids to be independent is allowed. Score Interpretation (from [address])    Independent   60-79 Minimally independent   40-59 Partially dependent   20-39 Very dependent   <20 Totally dependent     -Moira Amaro., Barthel, DRONA. (1965). Functional evaluation: the Barthel Index. [address] ([address] (1997). The Barthel activities of daily living index: self-reporting versus actual performance in the old (> or = 75 years). Journal [address] 45(7), [facility], RADHAF, Noy Carroll., Renetta Ibarra. (1999). Measuring the change in disability after inpatient rehabilitation; comparison of the responsiveness of the Barthel Index and Functional [place] Measure. Journal of Neurology, Neurosurgery, and Psychiatry, 66(4), 483-508. Mauricio Malone, N.J.A, JIMMY Layne, & Keyur Miller, M.A. (2004) Assessment of post-stroke quality of life in cost-effectiveness studies: The usefulness of the Barthel Index and the EuroQoL-5D.  Quality of Life Research, 15, 260-56     Occupational Therapy Evaluation Charge Determination   History Examination Decision-Making   LOW Complexity : Brief history review  LOW Complexity : 1-3 performance deficits relating to physical, cognitive , or psychosocial skils that result in activity limitations and / or participation restrictions  LOW Complexity : No comorbidities that affect functional and no verbal or physical assistance needed to complete eval tasks       Based on the above components, the patient evaluation is determined to be of the following complexity level: LOW   Pain Rating:  Reporting mild pain in shoulder    Activity Tolerance: Good    After treatment patient left in no apparent distress:    Sitting in chair, Call bell within reach, and RN aware    COMMUNICATION/EDUCATION:   The patients plan of care was discussed with: Physical therapist and Registered nurse.      Thank you for this referral.  Miller Villar, OT  Time Calculation: 17 mins

## 2022-10-01 NOTE — PROGRESS NOTES
ASSESSMENT/PLAN:  Below is the assessment and plan developed based on review of pertinent history, physical exam, labs, studies, and medications. 1. Impingement syndrome of shoulder, right  -     REFERRAL TO PHYSICAL THERAPY      Return in about 3 weeks (around 10/24/2022). After discussing treatment options, we have decided to proceed with formal physical therapy as well as a home exercise program for rehabilitation of the shoulder. We reviewed active elbow range of motion and postural exercises today in the office. We went over the arthroscopic pictures and removed the stitches during today´s visit. We will continue with ice and elevation of the shoulder to decrease swelling and pain. We will continue to utilize early mobilization and mechanical prophylaxis to reduce the chances of a deep vein thrombosis. We will wean them off any narcotic medications and progress to anti-inflammatories and Tylenol as long as there are no contraindications to these medications. We also discussed the risk and benefits and common side effects of taking these medications at today´s visit. We also had a discussion regarding not driving while on narcotic medications and while impaired from a surgical or medical condition. I will see them back in three weeks time to evaluate their progress. They will call us in the interim if they have any questions or concerns prior to their follow up visit. SUBJECTIVE/OBJECTIVE:  Quinten Barraza (: 1975) is a 48 y.o. female, patient,here for evaluation of the Surgical Follow-up (Post op follow up - right should sx on 2022)  . The patient returns today for follow-up of her right shoulder. She underwent right shoulder arthroscopy with acromioplasty, distal clavicle excision, and extensive debridement on 2022. She has been icing elevating. She has been out of her sling. She denies any numbness tingling erythema or warmth.        PHYSICAL EXAM:    Examination of the operative shoulder reveals that the incisions are healing well, without evidence of drainage, erythema or warmth. There is limited range of motion secondary to discomfort. Strength is 5/5 distally. There is no tenderness at the elbow and wrist. Sensation is intact to light touch distally and there is a brisk capillary refill. Allergies   Allergen Reactions    Gabapentin Unknown (comments)       Current Outpatient Medications   Medication Sig    HYDROcodone-acetaminophen (NORCO) 7.5-325 mg per tablet Take 1 Tablet by mouth every eight (8) hours. Scheduled    pregabalin (LYRICA) 150 mg capsule Take 150 mg by mouth nightly. tiZANidine (ZANAFLEX) 4 mg tablet Take 4 mg by mouth two (2) times daily as needed for Muscle Spasm(s). ascorbic acid, vitamin C, (VITAMIN C) 500 mg tablet Take 500 mg by mouth daily. calcium carbonate (OS-BREANA) 500 mg calcium (1,250 mg) tablet Take 1 Tablet by mouth daily. ALPRAZolam (XANAX) 0.25 mg tablet Take 0.25 mg by mouth nightly as needed for Anxiety. Last filled 3/9/22 #10 for 30 days supply    escitalopram oxalate (LEXAPRO) 10 mg tablet Take 10 mg by mouth daily. lidocaine (LIDODERM) 5 % Apply patch to the affected area for 12 hours a day and remove for 12 hours a day. lidocaine (XYLOCAINE) 5 % ointment Apply to affected area twice daily as needed for pain     No current facility-administered medications for this visit.        Past Medical History:   Diagnosis Date    Anemia     Anemia     Anemia     Anemia     Anemia NEC     Blood transfusion     Bronchitis     Community acquired pneumonia     Gastritis     Gastrointestinal disorder     hiatal hernia, gastric ulcers    Hiatal hernia     IBS (irritable bowel syndrome)     Ill-defined condition     Anemia    Migraine     Sinus infection        Past Surgical History:   Procedure Laterality Date    HX HERNIA REPAIR  2013    Nissen fundoplication    HX HERNIA REPAIR  7/24/2014    HX HYSTERECTOMY  2012    HX KNEE ARTHROSCOPY HX OTHER SURGICAL      noroplant removed from L arm       Family History   Problem Relation Age of Onset    Stroke Mother     Lung Disease Father     Bleeding Prob Father     Heart Disease Father     Hypertension Father     Anesth Problems Neg Hx        Social History     Socioeconomic History    Marital status: SINGLE     Spouse name: Not on file    Number of children: Not on file    Years of education: Not on file    Highest education level: Not on file   Occupational History    Not on file   Tobacco Use    Smoking status: Former     Packs/day: 0.50     Years: 10.00     Pack years: 5.00     Types: Cigars, Cigarettes    Smokeless tobacco: Current    Tobacco comments:     SMOKES CIGARS   Vaping Use    Vaping Use: Some days    Substances: Flavoring   Substance and Sexual Activity    Alcohol use: Yes     Alcohol/week: 0.0 standard drinks     Comment: Occ    Drug use: No     Types: Prescription    Sexual activity: Never     Comment: seperated--has 3 children   Other Topics Concern    Not on file   Social History Narrative    Not on file     Social Determinants of Health     Financial Resource Strain: Not on file   Food Insecurity: Not on file   Transportation Needs: Not on file   Physical Activity: Not on file   Stress: Not on file   Social Connections: Not on file   Intimate Partner Violence: Not on file   Housing Stability: Not on file       Review of Systems    No flowsheet data found. Vitals:  Ht 5' 5\" (1.651 m)   Wt 220 lb (99.8 kg)   LMP 01/04/2012   BMI 36.61 kg/m²    Body mass index is 36.61 kg/m². ROS    Positive for: Musculoskeletal  Last edited by Glenn Caraballo on 10/3/2022 10:25 AM.            An electronic signature was used to authenticate this note.   -- Sergey Lott MD

## 2022-10-03 ENCOUNTER — OFFICE VISIT (OUTPATIENT)
Dept: ORTHOPEDIC SURGERY | Age: 47
End: 2022-10-03
Payer: OTHER MISCELLANEOUS

## 2022-10-03 VITALS — HEIGHT: 65 IN | WEIGHT: 220 LBS | BODY MASS INDEX: 36.65 KG/M2

## 2022-10-03 DIAGNOSIS — M75.41 IMPINGEMENT SYNDROME OF SHOULDER, RIGHT: Primary | ICD-10-CM

## 2022-10-03 PROCEDURE — 99024 POSTOP FOLLOW-UP VISIT: CPT | Performed by: ORTHOPAEDIC SURGERY

## 2022-10-03 NOTE — PROGRESS NOTES
Physician Progress Note      Kaleb Marley  CSN #:                  [id number]  :                       1975  ADMIT DATE:       2022 12:21 PM  [address] DATE:        2022 5:21 PM  RESPONDING  PROVIDER #:        Josephine Garcia MD          QUERY TEXT:    Pt admitted with right diaphragmatic paralysis. Pt noted to have received a block used for right shoulder surgery 1 day prior to admission. If possible, please document in progress notes and discharge summary the relationship, if any, between right diaphragmatic paralysis and nerve block for right shoulder surgery. The medical record reflects the following:  Risk Factors: 44yo who recently had a nerve block for right shoulder surgery    Clinical Indicators:  Pulmonary notes  Acute hypoxic resp failure- suspect parlyzed right hemidiaph likely from cervical nerve root injury due to block used for right shoulder surgery. Treatment: Pulmonary following, CPAP, PFTS, SNIFF test    Thank you,  Yolanda Pham  [phone]  I am also available via [employer]. 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     Query created by: Tom Wilson on 10/3/2022 3:45 PM      Electronically signed by:  Josephine Garcia MD 10/3/2022 4:28 PM

## 2022-10-03 NOTE — LETTER
NOTIFICATION RETURN TO WORK / SCHOOL    10/3/2022 11:20 AM    Ms. [address] [facility] [id number]      To Whom It May Concern:    Veronique Garcia is currently under the care of Sampson Olsen. 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 We will see her back in 3 weeks time to evaluate her progress. She will be starting some physical therapy. If there are questions or concerns please have the patient contact our office.         Sincerely,      Ayanna Pardo MD

## 2022-10-11 ENCOUNTER — OFFICE VISIT (OUTPATIENT)
Dept: ORTHOPEDIC SURGERY | Age: 47
End: 2022-10-11
Payer: OTHER MISCELLANEOUS

## 2022-10-11 DIAGNOSIS — Z98.890 S/P ARTHROSCOPY OF RIGHT SHOULDER: ICD-10-CM

## 2022-10-11 DIAGNOSIS — M25.611 STIFFNESS OF RIGHT SHOULDER JOINT: ICD-10-CM

## 2022-10-11 DIAGNOSIS — M75.41 IMPINGEMENT SYNDROME OF SHOULDER, RIGHT: ICD-10-CM

## 2022-10-11 DIAGNOSIS — M25.511 RIGHT SHOULDER PAIN, UNSPECIFIED CHRONICITY: Primary | ICD-10-CM

## 2022-10-11 PROCEDURE — 97161 PT EVAL LOW COMPLEX 20 MIN: CPT | Performed by: PHYSICAL THERAPIST

## 2022-10-11 PROCEDURE — 97110 THERAPEUTIC EXERCISES: CPT | Performed by: PHYSICAL THERAPIST

## 2022-10-11 NOTE — PROGRESS NOTES
SHOULDER EVALUATION    Patient Name: Pooja Dias  Date:10/11/2022  : 1975  [x]  Patient  Verified  Payor: [employer] / Plan: [id number] / Product Type: Workers Comp /    Total Treatment Time (min): 40  Total Timed Codes (min): 40  Referring Physician:   Shyla Cisneros MD       1. Right shoulder pain, unspecified chronicity  2. Stiffness of right shoulder joint  3. S/P arthroscopy of right shoulder  4. Impingement syndrome of shoulder, right [M75.41 (ICD-10-CM)]      SUBJECTIVE  Patient is a 40-year-old female referred to physical therapy by Dr. Chirinos Lights status post right shoulder arthroscopy with acromioplasty, distal clavicle excision, and extensive debridement performed on . She is now 2 weeks postop. 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 She did try PT first but was not completely successful and therefore had surgery. Of note, she went to the emergency room the day after surgery as she was having difficulty breathing and stayed there until that Friday. Her pain has still been significant, VAS 10/10 at its worst.  She has been using her ice machine regularly and taking Tylenol as needed. She is right-hand dominant and having expected difficulty with ADLs i.e. dressing, bathing, etc.  Her goals for PT are to return to her previous level of function. Past Medical History:   Diagnosis Date    Anemia     Anemia     Anemia     Anemia     Anemia NEC     Blood transfusion     Bronchitis     Community acquired pneumonia     Gastritis     Gastrointestinal disorder     hiatal hernia, gastric ulcers    Hiatal hernia     IBS (irritable bowel syndrome)     Ill-defined condition     Anemia    Migraine     Sinus infection         OBJECTIVE  Observations  Portal sites still healing, moderate ecchymosis along the superior and posterior aspect of the shoulder.   Holds a forward head and rounded shoulder posture. Cervical Spine Clearing  Active cervical motion is WNL and pain-free     Palpation  Generalized tenderness to the anterior shoulder    AROM   Not assessed at this time. She has all full elbow, wrist, digital motion. Full flexion and abduction on the left, internal rotation to lower thoracic spine, external rotation to T3             PROM  Good early motion, minimal discomfort at endrange in all planes. ER 45 degrees, flexion 110 degrees, abduction 100 degrees, internal rotation 40 degrees    Strength  Not assessed at this time.  strength grossly limited 50% compared to the left    Joint mobility  Relative posterior and inferior capsule hypomobility noted. SPADI  Pain 98%  Disability 94%  Total Score 25%    Treatment  Performed evaluation. Therapeutic exercise: (20 minutes): Gentle rotator cuff release, grade 1-2 posterior/inferior glenohumeral joint mobilization, passive range of motion all planes to tolerance. Provided and educated patient with home exercise program focused on improving range of motion Discussed patient goals and collaborated about progressive plan of care. ASSESSMENT  Patient presents with impairments related to posture, range of motion, strength, ability to perform ADLs that involve reaching overhead, carrying heavy objects, and ability to participate in desired activity following right shoulder arthroscopy. She will benefit from physical therapy to address above limitations and maximize function. Short-term Goals (1 week)  1. In 1 week, patient will demonstrate independence with home exercise program.    Long-term Goals (6-8 weeks)  1. Patient will report at least 25% decrease in pain to improve activity tolerance. 2. Patient will demonstrate range of motion within normal limits to independence and ease with ADLs. 3. Patient will return to normal  activities without increase in symptoms from baseline.       Interventions to include but are not limited to joint mobilizations, soft tissue mobilization, myofascial release, passive range of motion, therapeutic exercise, neuromuscular reeducation, dry needling, taping, and modalities as indicated. Frequency: 2x per week. Duration 20 visits. Chris Tenorio, PT 10/11/2022  11:00 AM    The referring physician has reviewed and approved this evaluation and plan of care as noted by the electronic signature attached to note.

## 2022-10-13 ENCOUNTER — OFFICE VISIT (OUTPATIENT)
Dept: ORTHOPEDIC SURGERY | Age: 47
End: 2022-10-13
Payer: OTHER MISCELLANEOUS

## 2022-10-13 DIAGNOSIS — Z98.890 S/P ARTHROSCOPY OF RIGHT SHOULDER: ICD-10-CM

## 2022-10-13 DIAGNOSIS — M25.511 RIGHT SHOULDER PAIN, UNSPECIFIED CHRONICITY: Primary | ICD-10-CM

## 2022-10-13 DIAGNOSIS — M25.611 STIFFNESS OF RIGHT SHOULDER JOINT: ICD-10-CM

## 2022-10-13 PROCEDURE — 97140 MANUAL THERAPY 1/> REGIONS: CPT | Performed by: PHYSICAL THERAPIST

## 2022-10-13 PROCEDURE — 97110 THERAPEUTIC EXERCISES: CPT | Performed by: PHYSICAL THERAPIST

## 2022-10-13 NOTE — PROGRESS NOTES
PT DAILY TREATMENT NOTE    Patient Name: Veronique Garcia  Date:10/13/2022  : 1975  [x]  Patient  Verified  Payor: Tracee Richardson / Plan: [id number] / Product Type: Workers Comp /    Total Treatment Time (min): 45  Total Timed Codes (min): 45  Referring Physician: Michale Lorenzo MD     Treatment Area: Right shoulder    SUBJECTIVE  Sore but no questions or problems with home exercises    OBJECTIVE  Modality:   []  E-Stim: type _ x _ min     []att   []unatt   []w/US   []w/ice   []w/heat  []  Ultrasound: []cont   []pulse    _ W/cm2 x _  min   []1MHz   []3MHz  []  Ice pack _  min       []  Hot pack _  min       []  Other:     Therapeutic Exercise: (minutes: 30)  [x] see exercise log      Added/Changed Exercises:  []  Added:   []  Changed:       Manual Therapy: (minutes: 15)  Rotator cuff release, oscillations, traction. Soft tissue work to pectoralis minor. Posterior/inferior glenohumeral joint mobilization. Passive range of motion all planes to tolerance. Patient Education: [x] Review HEP    [] Progressed/Changed HEP:      Other Objective/Functional Measures:     ASSESSMENT  []  See Plan of Care  []  See progress note/recertification  [x]  Patient will continue to benefit from skilled therapy to address remaining functional deficits:     Most guarded with flexion to about 100 degrees. She did well with all new active assisted exercises. Follow her again twice next week. ICD-10-CM ICD-9-CM    1. Right shoulder pain, unspecified chronicity  M25.511 719.41       2. Stiffness of right shoulder joint  M25.611 719.51       3.  S/P arthroscopy of right shoulder  Z98.890 V45.89           PLAN  [x] Progress as tolerated under current plan towards long-term goals  [] Discharge  [] Other:        PT Exercise Log         Activity/Exercise Date  10/13/22      Pulleys x     Rows (green) x      ER/IR iso (yellow) x     TG roll x     Table roll x     Wand flexion/ER x Sandy Garcia, PT 10/13/2022  10:23 AM

## 2022-10-20 ENCOUNTER — OFFICE VISIT (OUTPATIENT)
Dept: ORTHOPEDIC SURGERY | Age: 47
End: 2022-10-20
Payer: OTHER MISCELLANEOUS

## 2022-10-20 DIAGNOSIS — M25.611 STIFFNESS OF RIGHT SHOULDER JOINT: ICD-10-CM

## 2022-10-20 DIAGNOSIS — Z98.890 S/P ARTHROSCOPY OF RIGHT SHOULDER: ICD-10-CM

## 2022-10-20 DIAGNOSIS — M25.511 RIGHT SHOULDER PAIN, UNSPECIFIED CHRONICITY: Primary | ICD-10-CM

## 2022-10-20 PROCEDURE — 97140 MANUAL THERAPY 1/> REGIONS: CPT | Performed by: PHYSICAL THERAPIST

## 2022-10-20 PROCEDURE — 97110 THERAPEUTIC EXERCISES: CPT | Performed by: PHYSICAL THERAPIST

## 2022-10-20 NOTE — PROGRESS NOTES
PT DAILY TREATMENT NOTE    Patient Name: Nichole Luna  CABL:  : 1975  [x]  Patient  Verified  Payor: Huey Bernard / Plan: [id number] / Product Type: Workers Comp /    Total Treatment Time (min): 45  Total Timed Codes (min): 45  Referring Physician: Raissa Tirado MD     Treatment Area: Right shoulder    SUBJECTIVE  Still has been sore, catches herself trying to reach out and is painful. Reports compliance with home exercises    OBJECTIVE  Modality:   []  E-Stim: type _ x _ min     []att   []unatt   []w/US   []w/ice   []w/heat  []  Ultrasound: []cont   []pulse    _ W/cm2 x _  min   []1MHz   []3MHz  []  Ice pack _  min       []  Hot pack _  min       []  Other:     Therapeutic Exercise: (minutes: 30)  [x] see exercise log      Added/Changed Exercises:  []  Added:   []  Changed:       Manual Therapy: (minutes: 15)  Rotator cuff release, oscillations, traction. Soft tissue work to pectoralis minor. Posterior/inferior glenohumeral joint mobilization. Passive range of motion all planes to tolerance. Patient Education: [x] Review HEP    [] Progressed/Changed HEP:      Other Objective/Functional Measures:     ASSESSMENT  []  See Plan of Care  []  See progress note/recertification  [x]  Patient will continue to benefit from skilled therapy to address remaining functional deficits:     Her passive motion is substantially improved this week. She did not miss appointment earlier this week due to family health issues. She does still have a scab healing along the anterior aspect plan to follow her twice next week. ICD-10-CM ICD-9-CM    1. Right shoulder pain, unspecified chronicity  M25.511 719.41       2. Stiffness of right shoulder joint  M25.611 719.51       3.  S/P arthroscopy of right shoulder  Z98.890 V45.89           PLAN  [x] Progress as tolerated under current plan towards long-term goals  [] Discharge  [] Other:        PT Exercise Log Activity/Exercise Date  10/20/22      Pulleys x     Rows (green) x      ER/IR iso (yellow) x     TG roll x     Table roll x     Wand flexion/ER x                                                                             Stevenson Lo, PT 10/20/2022  10:23 AM

## 2022-10-25 ENCOUNTER — OFFICE VISIT (OUTPATIENT)
Dept: ORTHOPEDIC SURGERY | Age: 47
End: 2022-10-25
Payer: OTHER MISCELLANEOUS

## 2022-10-25 DIAGNOSIS — Z98.890 S/P ARTHROSCOPY OF RIGHT SHOULDER: ICD-10-CM

## 2022-10-25 DIAGNOSIS — M25.611 STIFFNESS OF RIGHT SHOULDER JOINT: ICD-10-CM

## 2022-10-25 DIAGNOSIS — M25.511 RIGHT SHOULDER PAIN, UNSPECIFIED CHRONICITY: Primary | ICD-10-CM

## 2022-10-25 PROCEDURE — 97140 MANUAL THERAPY 1/> REGIONS: CPT | Performed by: PHYSICAL THERAPIST

## 2022-10-25 PROCEDURE — 97110 THERAPEUTIC EXERCISES: CPT | Performed by: PHYSICAL THERAPIST

## 2022-10-25 NOTE — PROGRESS NOTES
PT DAILY TREATMENT NOTE    Patient Name: Pooja Dias  Date:10/25/2022  : 1975  [x]  Patient  Verified  Payor: [employer] / Plan: [id number] / Product Type: Workers Comp /    Total Treatment Time (min): 45  Total Timed Codes (min): 45  Referring Physician: Shyla Cisneros MD     Treatment Area: Right shoulder    SUBJECTIVE  Still sore but otherwise nothing really new    OBJECTIVE  Modality:   []  E-Stim: type _ x _ min     []att   []unatt   []w/US   []w/ice   []w/heat  []  Ultrasound: []cont   []pulse    _ W/cm2 x _  min   []1MHz   []3MHz  []  Ice pack _  min       []  Hot pack _  min       []  Other:     Therapeutic Exercise: (minutes: 30)  [x] see exercise log      Added/Changed Exercises:  []  Added:   []  Changed:       Manual Therapy: (minutes: 15)  Rotator cuff release, oscillations, traction. Soft tissue work to pectoralis minor. Posterior/inferior glenohumeral joint mobilization. Passive range of motion all planes to tolerance. Patient Education: [x] Review HEP    [] Progressed/Changed HEP:      Other Objective/Functional Measures:     ASSESSMENT  []  See Plan of Care  []  See progress note/recertification  [x]  Patient will continue to benefit from skilled therapy to address remaining functional deficits:     Gradual improvements in passive mobility. She is about 4 weeks postop now. She can actively elevate to about 90 degrees. Follow her again later this week      ICD-10-CM ICD-9-CM    1. Right shoulder pain, unspecified chronicity  M25.511 719.41       2. Stiffness of right shoulder joint  M25.611 719.51       3.  S/P arthroscopy of right shoulder  Z98.890 V45.89           PLAN  [x] Progress as tolerated under current plan towards long-term goals  [] Discharge  [] Other:        PT Exercise Log         Activity/Exercise Date  10/25/22      Pulleys x     Rows (green) x      ER/IR iso (yellow) x     TG roll x     Table roll x     Wand flexion/ER x                                                                             Ardis Bernheim, PT 10/25/2022  10:23 AM

## 2022-10-27 ENCOUNTER — OFFICE VISIT (OUTPATIENT)
Dept: ORTHOPEDIC SURGERY | Age: 47
End: 2022-10-27
Payer: OTHER MISCELLANEOUS

## 2022-10-27 DIAGNOSIS — M25.511 RIGHT SHOULDER PAIN, UNSPECIFIED CHRONICITY: Primary | ICD-10-CM

## 2022-10-27 DIAGNOSIS — M25.611 STIFFNESS OF RIGHT SHOULDER JOINT: ICD-10-CM

## 2022-10-27 DIAGNOSIS — Z98.890 S/P ARTHROSCOPY OF RIGHT SHOULDER: ICD-10-CM

## 2022-10-27 PROCEDURE — 97110 THERAPEUTIC EXERCISES: CPT | Performed by: PHYSICAL THERAPIST

## 2022-10-27 PROCEDURE — 97140 MANUAL THERAPY 1/> REGIONS: CPT | Performed by: PHYSICAL THERAPIST

## 2022-10-27 NOTE — PROGRESS NOTES
PT DAILY TREATMENT NOTE    Patient Name: Bharat Argueta  KALJ:  : 1975  [x]  Patient  Verified  Payor: [employer] Mountain / Plan: [id number] / Product Type: Workers Comp /    Total Treatment Time (min): 60  Total Timed Codes (min): 60  Referring Physician: Sara Ochoa MD     Treatment Area: Right shoulder    SUBJECTIVE  Patient reports she continues to have predominance of her pain over the [facility] joint. She reports she has been diligent with her home exercise program.    OBJECTIVE  Modality:   []  E-Stim: type _ x _ min     []att   []unatt   []w/US   []w/ice   []w/heat  []  Ultrasound: []cont   []pulse    _ W/cm2 x _  min   []1MHz   []3MHz  []  Ice pack _  min       []  Hot pack _  min       []  Other:     Therapeutic Exercise: (minutes: 45)  [x] see exercise log      Added/Changed Exercises:  []  Added:   []  Changed:       Manual Therapy: (minutes: 15)  Rotator cuff release, oscillations, traction. Soft tissue work to pectoralis minor. Posterior/inferior glenohumeral joint mobilization. Passive range of motion all planes to tolerance. Side-lying posterior capsule soft tissue mobilization and stretching. Patient Education: [x] Review HEP    [] Progressed/Changed HEP:      Other Objective/Functional Measures:     ASSESSMENT  []  See Plan of Care  []  See progress note/recertification  [x]  Patient will continue to benefit from skilled therapy to address remaining functional deficits:     Continues have very good passive range of motion her active range of motion continues to be painful with tenderness noted over the acromioplasty. She is progressing with functional strengthening and postural reeducation we will continue to advance this. Continue current home activity range of motion posture and strengthening. ICD-10-CM ICD-9-CM    1. Right shoulder pain, unspecified chronicity  M25.511 719.41       2.  Stiffness of right shoulder joint  M25.611 719.51       3.  S/P arthroscopy of right shoulder  Z98.890 V45.89           PLAN  [x] Progress as tolerated under current plan towards long-term goals  [] Discharge  [] Other:        PT Exercise Log         Activity/Exercise Date  10/27/22      Pulleys x     Rows (green) x      ER/IR iso (yellow) x     TG roll x     Table roll x     Wand flexion/ER/extension x     Side-lying external rotation 0 pounds x     Side-lying active assisted forward elevation lateral abduction with scapular upward rotation x                                                                 Jerene Landing, PT 10/27/2022  10:23 AM

## 2022-10-28 ENCOUNTER — OFFICE VISIT (OUTPATIENT)
Dept: ORTHOPEDIC SURGERY | Age: 47
End: 2022-10-28
Payer: OTHER MISCELLANEOUS

## 2022-10-28 VITALS — WEIGHT: 220 LBS | BODY MASS INDEX: 36.65 KG/M2 | HEIGHT: 65 IN

## 2022-10-28 DIAGNOSIS — M75.41 IMPINGEMENT SYNDROME OF SHOULDER, RIGHT: Primary | ICD-10-CM

## 2022-10-28 PROCEDURE — 99024 POSTOP FOLLOW-UP VISIT: CPT | Performed by: ORTHOPAEDIC SURGERY

## 2022-10-28 NOTE — LETTER
NOTIFICATION RETURN TO WORK / SCHOOL    10/28/2022 1:51 PM    [address] [facility] [address]      To Whom It May Concern:    Rafael Flaherty is currently under the care of [facility]. Please allow her to remain on sedentary duty at work. We will increase her physical therapy to 3 times a week to prevent adhesive capsulitis or frozen shoulder. We will see her back in 4 weeks time to evaluate her progress. We will place her on aggressive home exercise program that requires stretching every hour. If there are questions or concerns please have the patient contact our office.         Sincerely,      Palmira Stanton MD

## 2022-10-28 NOTE — PROGRESS NOTES
ASSESSMENT/PLAN:  Below is the assessment and plan developed based on review of pertinent history, physical exam, labs, studies, and medications. 1. Impingement syndrome of shoulder, right  -     REFERRAL TO PHYSICAL THERAPY      We will increase her physical therapy to 3 times a week and place her on a home exercise program require stretching every hour to prevent a frozen shoulder. We talked about today that she did not have a repair of the rotator cuff and that we could certainly be more aggressive with her rehabilitation. We will continue her working a sedentary position. I will see her back in 4 weeks time to evaluate her progress. SUBJECTIVE/OBJECTIVE:  Ashtyn Tee (: 1975) is a 48 y.o. female, patient,here for evaluation of the Surgical Follow-up (Post op follow up - right should sx on 2022)  . The patient returns today for follow-up of her right shoulder. She underwent right shoulder arthroscopy with acromioplasty, distal clavicle excision, and extensive debridement on 2022. She has been icing elevating. She has been out of her sling. She denies any numbness tingling erythema or warmth. PHYSICAL EXAM:    Examination of the operative shoulder reveals that the incisions are healing well, without evidence of drainage, erythema or warmth. There is limited range of motion secondary to discomfort. Strength is 5/5 distally. There is no tenderness at the elbow and wrist. Sensation is intact to light touch distally and there is a brisk capillary refill. Allergies   Allergen Reactions    Gabapentin Unknown (comments)       Current Outpatient Medications   Medication Sig    HYDROcodone-acetaminophen (NORCO) 7.5-325 mg per tablet Take 1 Tablet by mouth every eight (8) hours. Scheduled    pregabalin (LYRICA) 150 mg capsule Take 150 mg by mouth nightly. tiZANidine (ZANAFLEX) 4 mg tablet Take 4 mg by mouth two (2) times daily as needed for Muscle Spasm(s).     ascorbic acid, vitamin C, (VITAMIN C) 500 mg tablet Take 500 mg by mouth daily. calcium carbonate (OS-BREANA) 500 mg calcium (1,250 mg) tablet Take 1 Tablet by mouth daily. ALPRAZolam (XANAX) 0.25 mg tablet Take 0.25 mg by mouth nightly as needed for Anxiety. Last filled 3/9/22 #10 for 30 days supply    escitalopram oxalate (LEXAPRO) 10 mg tablet Take 10 mg by mouth daily. lidocaine (LIDODERM) 5 % Apply patch to the affected area for 12 hours a day and remove for 12 hours a day. lidocaine (XYLOCAINE) 5 % ointment Apply to affected area twice daily as needed for pain     No current facility-administered medications for this visit.        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       Family History   Problem Relation Age of Onset    Stroke Mother     Lung Disease Father     Bleeding Prob Father     Heart Disease Father     Hypertension Father     Anesth Problems Neg Hx        Social History     Socioeconomic History    Marital status: SINGLE     Spouse name: Not on file    Number of children: Not on file    Years of education: Not on file    Highest education level: Not on file   Occupational History    Not on file   Tobacco Use    Smoking status: Former     Packs/day: 0.50     Years: 10.00     Pack years: 5.00     Types: Cigars, Cigarettes    Smokeless tobacco: Current    Tobacco comments:     SMOKES CIGARS   Vaping Use    Vaping Use: Some days Substances: Flavoring   Substance and Sexual Activity    Alcohol use: Yes     Alcohol/week: 0.0 standard drinks     Comment: Occ    Drug use: No     Types: Prescription    Sexual activity: Never     Comment: seperated--has 3 children   Other Topics Concern    Not on file   Social History Narrative    Not on file     Social Determinants of Health     Financial Resource Strain: Not on file   Food Insecurity: Not on file   Transportation Needs: Not on file   Physical Activity: Not on file   Stress: Not on file   Social Connections: Not on file   Intimate Partner Violence: Not on file   Housing Stability: Not on file       Review of Systems    No flowsheet data found. Vitals:  Ht 5' 5\" (1.651 m)   Wt 220 lb (99.8 kg)   LMP 01/04/2012   BMI 36.61 kg/m²    Body mass index is 36.61 kg/m². ROS    Positive for: Musculoskeletal  Last edited by Melina Arce on 10/3/2022 10:25 AM.            An electronic signature was used to authenticate this note.   -- Raji Escobar MD

## 2022-11-01 ENCOUNTER — OFFICE VISIT (OUTPATIENT)
Dept: ORTHOPEDIC SURGERY | Age: 47
End: 2022-11-01
Payer: OTHER MISCELLANEOUS

## 2022-11-01 DIAGNOSIS — M75.41 IMPINGEMENT SYNDROME OF SHOULDER, RIGHT: ICD-10-CM

## 2022-11-01 DIAGNOSIS — M25.511 RIGHT SHOULDER PAIN, UNSPECIFIED CHRONICITY: Primary | ICD-10-CM

## 2022-11-01 DIAGNOSIS — M25.611 STIFFNESS OF RIGHT SHOULDER JOINT: ICD-10-CM

## 2022-11-01 PROCEDURE — 97140 MANUAL THERAPY 1/> REGIONS: CPT | Performed by: PHYSICAL THERAPIST

## 2022-11-01 PROCEDURE — 97110 THERAPEUTIC EXERCISES: CPT | Performed by: PHYSICAL THERAPIST

## 2022-11-01 NOTE — PROGRESS NOTES
PT DAILY TREATMENT NOTE    Patient Name: Laurel Lawson  Date:2022  : 1975  [x]  Patient  Verified  Payor: [employer]ath[employer]oby [employer] / Plan: [id number] / Product Type: Workers Comp /    Total Treatment Time (min): 60  Total Timed Codes (min): 60  Referring Physician: Jeremiah Sosa MD     Treatment Area: Right shoulder    SUBJECTIVE  Saw Dr. Carlin Quan and was displeased with her progress so far. He would like to see her range of motion further along    OBJECTIVE  Modality:   []  E-Stim: type _ x _ min     []att   []unatt   []w/US   []w/ice   []w/heat  []  Ultrasound: []cont   []pulse    _ W/cm2 x _  min   []1MHz   []3MHz  []  Ice pack _  min       []  Hot pack _  min       []  Other:     Therapeutic Exercise: (minutes: 45)  [x] see exercise log      Added/Changed Exercises:  [x]  Added: UBE, wall walks, clinger  []  Changed:       Manual Therapy: (minutes: 15)  Rotator cuff release, oscillations, traction. Soft tissue work to pectoralis minor. Posterior/inferior glenohumeral joint mobilization. Passive range of motion all planes to tolerance. Patient Education: [x] Review HEP    [] Progressed/Changed HEP:      Other Objective/Functional Measures:     ASSESSMENT  []  See Plan of Care  []  See progress note/recertification  [x]  Patient will continue to benefit from skilled therapy to address remaining functional deficits:     her active elevation has improved since last week. Did well with all new mobility exercises, we will continue progressing later this week as tolerated      ICD-10-CM ICD-9-CM    1. Right shoulder pain, unspecified chronicity  M25.511 719.41       2. Stiffness of right shoulder joint  M25.611 719.51       3.  Impingement syndrome of shoulder, right  M75.41 726.2           PLAN  [x] Progress as tolerated under current plan towards long-term goals  [] Discharge  [] Other:        PT Exercise Log         Activity/Exercise Date  22 Pulleys x     Rows (green) x      ER/IR iso (yellow) x     TG roll x     Table roll x     Wand flexion/ER x     UBE x8' x     Wall walks x                                                                 Blade Hopkins, PT 11/1/2022  10:23 AM

## 2022-11-03 ENCOUNTER — OFFICE VISIT (OUTPATIENT)
Dept: ORTHOPEDIC SURGERY | Age: 47
End: 2022-11-03
Payer: OTHER MISCELLANEOUS

## 2022-11-03 DIAGNOSIS — M25.511 RIGHT SHOULDER PAIN, UNSPECIFIED CHRONICITY: Primary | ICD-10-CM

## 2022-11-03 DIAGNOSIS — M75.41 IMPINGEMENT SYNDROME OF SHOULDER, RIGHT: ICD-10-CM

## 2022-11-03 DIAGNOSIS — M25.611 STIFFNESS OF RIGHT SHOULDER JOINT: ICD-10-CM

## 2022-11-03 PROCEDURE — 97110 THERAPEUTIC EXERCISES: CPT | Performed by: PHYSICAL THERAPIST

## 2022-11-03 PROCEDURE — 97140 MANUAL THERAPY 1/> REGIONS: CPT | Performed by: PHYSICAL THERAPIST

## 2022-11-03 NOTE — PROGRESS NOTES
PT DAILY TREATMENT NOTE    Patient Name: Natasha Blackwell  Date:11/3/2022  : 1975  [x]  Patient  Verified  Payor: Odette Keenan / Plan: [address] / Product Type: Workers Comp /    Total Treatment Time (min): 40  Total Timed Codes (min): 40, reduced due to patient schedule  Referring Physician: Jaz Hodge MD     Treatment Area: Right shoulder    SUBJECTIVE  Nothing new since last visit, needs to leave early today due to another appointment    OBJECTIVE  Modality:   []  E-Stim: type _ x _ min     []att   []unatt   []w/US   []w/ice   []w/heat  []  Ultrasound: []cont   []pulse    _ W/cm2 x _  min   []1MHz   []3MHz  []  Ice pack _  min       []  Hot pack _  min       []  Other:     Therapeutic Exercise: (minutes: 25)  [x] see exercise log      Added/Changed Exercises:  []  Added:   []  Changed:       Manual Therapy: (minutes: 15)  Rotator cuff release, oscillations, traction. Soft tissue work to pectoralis minor. Posterior/inferior glenohumeral joint mobilization. Passive range of motion all planes to tolerance. Patient Education: [x] Review HEP    [] Progressed/Changed HEP:      Other Objective/Functional Measures:     ASSESSMENT  []  See Plan of Care  []  See progress note/recertification  [x]  Patient will continue to benefit from skilled therapy to address remaining functional deficits: Forward flexion to approximately 120 degrees, abduction 90 degrees. Her passive motion continues to improve. Pain remains a limiting factor with mobility/exercise progression. Follow her again next week      ICD-10-CM ICD-9-CM    1. Right shoulder pain, unspecified chronicity  M25.511 719.41       2. Stiffness of right shoulder joint  M25.611 719.51       3.  Impingement syndrome of shoulder, right  M75.41 726.2           PLAN  [x] Progress as tolerated under current plan towards long-term goals  [] Discharge  [] Other:        PT Exercise Log Activity/Exercise Date  11/03/22      Pulleys x     Rows (green) x      ER/IR iso (yellow) x     TG roll x     Table roll x     Wand flexion/ER      UBE x8'      Wall walks                                                                  Avila Samano [place] 11/3/2022  10:23 AM

## 2022-11-08 ENCOUNTER — OFFICE VISIT (OUTPATIENT)
Dept: ORTHOPEDIC SURGERY | Age: 47
End: 2022-11-08
Payer: OTHER MISCELLANEOUS

## 2022-11-08 DIAGNOSIS — M25.511 RIGHT SHOULDER PAIN, UNSPECIFIED CHRONICITY: Primary | ICD-10-CM

## 2022-11-08 DIAGNOSIS — Z98.890 S/P ARTHROSCOPY OF RIGHT SHOULDER: ICD-10-CM

## 2022-11-08 DIAGNOSIS — M25.611 STIFFNESS OF RIGHT SHOULDER JOINT: ICD-10-CM

## 2022-11-08 PROCEDURE — 97140 MANUAL THERAPY 1/> REGIONS: CPT | Performed by: PHYSICAL THERAPIST

## 2022-11-08 PROCEDURE — 97110 THERAPEUTIC EXERCISES: CPT | Performed by: PHYSICAL THERAPIST

## 2022-11-08 NOTE — PROGRESS NOTES
PT DAILY TREATMENT NOTE    Patient Name: Veronique Garcia  Date:2022  : 1975  [x]  Patient  Verified  Payor: Tracee Richardson / Plan: [address] / Product Type: Workers Comp /    Total Treatment Time (min): 60  Total Timed Codes (min): 60, reduced due to patient schedule  Referring Physician: Michael Lorenzo MD     Treatment Area: Right shoulder    SUBJECTIVE  Sore today    OBJECTIVE  Modality:   []  E-Stim: type _ x _ min     []att   []unatt   []w/US   []w/ice   []w/heat  []  Ultrasound: []cont   []pulse    _ W/cm2 x _  min   []1MHz   []3MHz  []  Ice pack _  min       []  Hot pack _  min       []  Other:     Therapeutic Exercise: (minutes: 45)  [x] see exercise log      Added/Changed Exercises:  [x]  Added: IR belt stretch  [x]  Changed: progressed iso to concentric TB ER/IR      Manual Therapy: (minutes: 15)  Rotator cuff release, oscillations, traction. Soft tissue work to pectoralis minor. Posterior/inferior glenohumeral joint mobilization. Passive range of motion all planes to tolerance. Patient Education: [x] Review HEP    [] Progressed/Changed HEP:      Other Objective/Functional Measures:     ASSESSMENT  []  See Plan of Care  []  See progress note/recertification  [x]  Patient will continue to benefit from skilled therapy to address remaining functional deficits:     Still having some distal clavicle pain, gradual improvements in passive mobility. She is still guarded at times with stretching, no issues with strengthening progression. Follow again later this week      ICD-10-CM ICD-9-CM    1. Right shoulder pain, unspecified chronicity  M25.511 719.41       2. Stiffness of right shoulder joint  M25.611 719.51       3.  S/P arthroscopy of right shoulder  Z98.890 V45.89           PLAN  [x] Progress as tolerated under current plan towards long-term goals  [] Discharge  [] Other:        PT Exercise Log         Activity/Exercise Date  22 Pulleys x     Rows (green) x      ER/IR active (yellow) x     TG roll x     Table roll x     Wand flexion/ER x     UBE x8' x     Wall walks x     IR belt stretch x                                                           Lili Martino, PT 11/8/2022  10:23 AM

## 2022-11-10 ENCOUNTER — OFFICE VISIT (OUTPATIENT)
Dept: ORTHOPEDIC SURGERY | Age: 47
End: 2022-11-10
Payer: OTHER MISCELLANEOUS

## 2022-11-10 DIAGNOSIS — Z98.890 S/P ARTHROSCOPY OF RIGHT SHOULDER: ICD-10-CM

## 2022-11-10 DIAGNOSIS — M25.611 STIFFNESS OF RIGHT SHOULDER JOINT: ICD-10-CM

## 2022-11-10 DIAGNOSIS — M25.511 RIGHT SHOULDER PAIN, UNSPECIFIED CHRONICITY: Primary | ICD-10-CM

## 2022-11-10 PROCEDURE — 97110 THERAPEUTIC EXERCISES: CPT | Performed by: PHYSICAL THERAPIST

## 2022-11-10 PROCEDURE — 97140 MANUAL THERAPY 1/> REGIONS: CPT | Performed by: PHYSICAL THERAPIST

## 2022-11-10 NOTE — PROGRESS NOTES
PT DAILY TREATMENT NOTE    Patient Name: Natasha Blackwell  Date:11/10/2022  : 1975  [x]  Patient  Verified  Payor: Odette Keenan / Plan: [address] / Product Type: Workers Comp /    Total Treatment Time (min): 60  Total Timed Codes (min): 60, reduced due to patient schedule  Referring Physician: Jaz Hodge MD     Treatment Area: Right shoulder    SUBJECTIVE  Very sore today. She has been doing IR belt stretch    OBJECTIVE  Modality:   []  E-Stim: type _ x _ min     []att   []unatt   []w/US   []w/ice   []w/heat  []  Ultrasound: []cont   []pulse    _ W/cm2 x _  min   []1MHz   []3MHz  []  Ice pack _  min       []  Hot pack _  min       []  Other:     Therapeutic Exercise: (minutes: 45)  [x] see exercise log      Added/Changed Exercises:  []  Added:   []  Changed:       Manual Therapy: (minutes: 15)  Rotator cuff release, oscillations, traction. Soft tissue work to pectoralis minor. Posterior/inferior glenohumeral joint mobilization. Passive range of motion all planes to tolerance. Patient Education: [x] Review HEP    [] Progressed/Changed HEP:      Other Objective/Functional Measures:     ASSESSMENT  []  See Plan of Care  []  See progress note/recertification  [x]  Patient will continue to benefit from skilled therapy to address remaining functional deficits:     She is still a bit guarded with passive motion and point tender to pectoralis minor but her motion is improving steadily. Follow her twice next week      ICD-10-CM ICD-9-CM    1. Right shoulder pain, unspecified chronicity  M25.511 719.41       2. Stiffness of right shoulder joint  M25.611 719.51       3.  S/P arthroscopy of right shoulder  Z98.890 V45.89           PLAN  [x] Progress as tolerated under current plan towards long-term goals  [] Discharge  [] Other:        PT Exercise Log         Activity/Exercise Date  11/10/22      Pulleys x     Rows (green) x      ER/IR active (yellow) x     TG roll x     Table roll x     Wand flexion/ER x     UBE x8' x     Wall walks x     IR belt stretch x     S/L ER 0lbs x                                                     Jimi Faulkner, PT 11/10/2022  10:23 AM

## 2022-11-15 ENCOUNTER — OFFICE VISIT (OUTPATIENT)
Dept: ORTHOPEDIC SURGERY | Age: 47
End: 2022-11-15
Payer: OTHER MISCELLANEOUS

## 2022-11-15 DIAGNOSIS — M25.511 RIGHT SHOULDER PAIN, UNSPECIFIED CHRONICITY: Primary | ICD-10-CM

## 2022-11-15 DIAGNOSIS — Z98.890 S/P ARTHROSCOPY OF RIGHT SHOULDER: ICD-10-CM

## 2022-11-15 DIAGNOSIS — M25.611 STIFFNESS OF RIGHT SHOULDER JOINT: ICD-10-CM

## 2022-11-15 PROCEDURE — 97110 THERAPEUTIC EXERCISES: CPT | Performed by: PHYSICAL THERAPIST

## 2022-11-15 PROCEDURE — 97140 MANUAL THERAPY 1/> REGIONS: CPT | Performed by: PHYSICAL THERAPIST

## 2022-11-15 NOTE — PROGRESS NOTES
PT DAILY TREATMENT NOTE    Patient Name: Rosi Castelan  Date:11/15/2022  : 1975  [x]  Patient  Verified  Payor: Jhonatan Stagers / Plan: [address] / Product Type: Workers Comp /    Total Treatment Time (min): 60  Total Timed Codes (min): 60  Referring Physician: Kristan Avalos MD     Treatment Area: Right shoulder    SUBJECTIVE  No new complaints    OBJECTIVE  Modality:   []  E-Stim: type _ x _ min     []att   []unatt   []w/US   []w/ice   []w/heat  []  Ultrasound: []cont   []pulse    _ W/cm2 x _  min   []1MHz   []3MHz  []  Ice pack _  min       []  Hot pack _  min       []  Other:     Therapeutic Exercise: (minutes: 45)  [x] see exercise log      Added/Changed Exercises:  []  Added:   []  Changed:       Manual Therapy: (minutes: 15)  Rotator cuff release, oscillations, traction. Soft tissue work to pectoralis minor. Posterior/inferior glenohumeral joint mobilization. Passive range of motion all planes to tolerance. Patient Education: [x] Review HEP    [] Progressed/Changed HEP:      Other Objective/Functional Measures:     ASSESSMENT  []  See Plan of Care  []  See progress note/recertification  [x]  Patient will continue to benefit from skilled therapy to address remaining functional deficits:     Her forward flexion is improving, still with some compensatory patterns. Behind the back reach remains limited to ipsilateral greater trochanter. We will continue working to restore full mobility and gradually progress strengthening as tolerated    ICD-10-CM ICD-9-CM    1. Right shoulder pain, unspecified chronicity  M25.511 719.41       2. Stiffness of right shoulder joint  M25.611 719.51       3.  S/P arthroscopy of right shoulder  Z98.890 V45.89           PLAN  [x] Progress as tolerated under current plan towards long-term goals  [] Discharge  [] Other:        PT Exercise Log         Activity/Exercise Date  11/15/22      Pulleys x     Rows (green) x      ER/IR active (yellow) x     TG roll x     Table roll x     Wand flexion/ER x     UBE x8' x     Wall walks x     IR belt stretch x     S/L ER 0lbs x                                                     Billie Urbina, PT 11/15/2022  10:23 AM

## 2022-11-17 ENCOUNTER — OFFICE VISIT (OUTPATIENT)
Dept: ORTHOPEDIC SURGERY | Age: 47
End: 2022-11-17
Payer: OTHER MISCELLANEOUS

## 2022-11-17 DIAGNOSIS — M25.611 STIFFNESS OF RIGHT SHOULDER JOINT: ICD-10-CM

## 2022-11-17 DIAGNOSIS — M75.41 IMPINGEMENT SYNDROME OF SHOULDER, RIGHT: ICD-10-CM

## 2022-11-17 DIAGNOSIS — M25.511 RIGHT SHOULDER PAIN, UNSPECIFIED CHRONICITY: Primary | ICD-10-CM

## 2022-11-17 DIAGNOSIS — Z98.890 S/P ARTHROSCOPY OF RIGHT SHOULDER: ICD-10-CM

## 2022-11-17 PROCEDURE — 97140 MANUAL THERAPY 1/> REGIONS: CPT | Performed by: PHYSICAL THERAPIST

## 2022-11-17 PROCEDURE — 97110 THERAPEUTIC EXERCISES: CPT | Performed by: PHYSICAL THERAPIST

## 2022-11-17 NOTE — PROGRESS NOTES
PT DAILY TREATMENT NOTE    Patient Name: Ashtyn Tee  Date:2022  : 1975  [x]  Patient  Verified  Payor: Bridget Gastelum / Plan: [id number] / Product Type: Workers Comp /    Total Treatment Time (min): 65  Total Timed Codes (min): 65  Referring Physician: James Ashford MD     Treatment Area: Right shoulder    SUBJECTIVE  Patient reports she continues to have significant sensitivity around the glenohumeral area predominantly over the anterior portion of the biceps and a bit more medially. OBJECTIVE  Modality:   []  E-Stim: type _ x _ min     []att   []unatt   []w/US   []w/ice   []w/heat  []  Ultrasound: []cont   []pulse    _ W/cm2 x _  min   []1MHz   []3MHz  []  Ice pack _  min       []  Hot pack _  min       []  Other:     Therapeutic Exercise: (minutes: 45)  [x] see exercise log      Added/Changed Exercises:  []  Added:   []  Changed:       Manual Therapy: (minutes: 20)  Rotator cuff release, oscillations, traction. Soft tissue work to pectoralis minor. Posterior/inferior glenohumeral joint mobilization. Passive range of motion all planes to tolerance. Hold relax stretching for internal and external rotation        Patient Education: [x] Review HEP    [] Progressed/Changed HEP:      Other Objective/Functional Measures:     ASSESSMENT  []  See Plan of Care  []  See progress note/recertification  [x]  Patient will continue to benefit from skilled therapy to address remaining functional deficits:     Patient continues to be exquisitely tender with palpation over the anterior shoulder in the subacromial space. With passive ranging and diversion she allows for at least 8 5 % of forward elevation. We continue to progress functional strength of the rotator cuff continue to encourage and work with active assisted range of motion work with scapular retraction and depression activities        ICD-10-CM ICD-9-CM    1.  Right shoulder pain, unspecified chronicity  M25.511 719.41       2. Stiffness of right shoulder joint  M25.611 719.51       3. S/P arthroscopy of right shoulder  Z98.890 V45.89       4.  Impingement syndrome of shoulder, right  M75.41 726.2             PLAN  [x] Progress as tolerated under current plan towards long-term goals  [] Discharge  [] Other:        PT Exercise Log         Activity/Exercise Date  11/17/22      Pulleys x     Rows (green) x      ER/IR active (yellow) x     TG roll x     Table roll x     Wand flexion/ER x     UBE x8' x     Wall walks x     IR belt stretch x     S/L ER 1lbs x     Rhythmic stabilization x                                               Anibal Damon, PT 11/17/2022  10:23 AM

## 2022-11-21 ENCOUNTER — OFFICE VISIT (OUTPATIENT)
Dept: ORTHOPEDIC SURGERY | Age: 47
End: 2022-11-21
Payer: OTHER MISCELLANEOUS

## 2022-11-21 DIAGNOSIS — M25.611 STIFFNESS OF RIGHT SHOULDER JOINT: ICD-10-CM

## 2022-11-21 DIAGNOSIS — M75.41 IMPINGEMENT SYNDROME OF SHOULDER, RIGHT: ICD-10-CM

## 2022-11-21 DIAGNOSIS — Z98.890 S/P ARTHROSCOPY OF RIGHT SHOULDER: ICD-10-CM

## 2022-11-21 DIAGNOSIS — M25.511 RIGHT SHOULDER PAIN, UNSPECIFIED CHRONICITY: Primary | ICD-10-CM

## 2022-11-21 PROCEDURE — 97140 MANUAL THERAPY 1/> REGIONS: CPT | Performed by: PHYSICAL THERAPIST

## 2022-11-21 PROCEDURE — 97110 THERAPEUTIC EXERCISES: CPT | Performed by: PHYSICAL THERAPIST

## 2022-11-21 NOTE — PROGRESS NOTES
PT DAILY TREATMENT NOTE    Patient Name: Mg Purvis  Date:2022  : 1975  [x]  Patient  Verified  Payor: [facility] [employer] / Plan: [address] / Product Type: Workers Comp /    Total Treatment Time (min): 65  Total Timed Codes (min): 65  Referring Physician: Kirby Boast, MD     Treatment Area: Right shoulder    SUBJECTIVE  Patient has no new or changed complaints. Anterior shoulder pain remains    OBJECTIVE  Modality:   []  E-Stim: type _ x _ min     []att   []unatt   []w/US   []w/ice   []w/heat  []  Ultrasound: []cont   []pulse    _ W/cm2 x _  min   []1MHz   []3MHz  []  Ice pack _  min       []  Hot pack _  min       []  Other:     Therapeutic Exercise: (minutes: 45)  [x] see exercise log      Added/Changed Exercises:  []  Added:   []  Changed:       Manual Therapy: (minutes: 20)  Rotator cuff release, oscillations, traction. Soft tissue work to pectoralis minor. Posterior/inferior glenohumeral joint mobilization. Passive range of motion all planes to tolerance. Hold relax stretching for internal and external rotation        Patient Education: [x] Review HEP    [] Progressed/Changed HEP:      Other Objective/Functional Measures:     ASSESSMENT  []  See Plan of Care  []  See progress note/recertification  [x]  Patient will continue to benefit from skilled therapy to address remaining functional deficits:     Anterior shoulder pain remains with motion as well as with palpation. She continues to guard with passive range of motion but with tension diverted we will continue to get majority of her motion. She does continue with functional strength loss we will continue to encourage home strengthening for the interscapular as well as the rotator cuff musculature. We will follow her again later in the week. We will apply KT tape at the next visit      ICD-10-CM ICD-9-CM    1. Right shoulder pain, unspecified chronicity  M25.511 719.41       2.  Stiffness of right shoulder joint  M25.611 719.51       3. S/P arthroscopy of right shoulder  Z98.890 V45.89       4.  Impingement syndrome of shoulder, right  M75.41 726.2               PLAN  [x] Progress as tolerated under current plan towards long-term goals  [] Discharge  [] Other:        PT Exercise Log         Activity/Exercise Date  11/22/22      Pulleys x     Rows (green) x      ER/IR active (yellow) x     TG roll x     Table roll x     Wand flexion/ER x     UBE x8' x     Wall walks x     IR belt stretch x     S/L ER 1lbs x     Rhythmic stabilization x     Bodyblade x                                         Juancarlos Stevens, PT 11/22/2022  10:23 AM

## 2022-11-22 ENCOUNTER — OFFICE VISIT (OUTPATIENT)
Dept: ORTHOPEDIC SURGERY | Age: 47
End: 2022-11-22
Payer: OTHER MISCELLANEOUS

## 2022-11-22 DIAGNOSIS — M75.41 IMPINGEMENT SYNDROME OF SHOULDER, RIGHT: ICD-10-CM

## 2022-11-22 DIAGNOSIS — M25.611 STIFFNESS OF RIGHT SHOULDER JOINT: ICD-10-CM

## 2022-11-22 DIAGNOSIS — M25.511 RIGHT SHOULDER PAIN, UNSPECIFIED CHRONICITY: Primary | ICD-10-CM

## 2022-11-22 DIAGNOSIS — Z98.890 S/P ARTHROSCOPY OF RIGHT SHOULDER: ICD-10-CM

## 2022-11-22 PROCEDURE — 97110 THERAPEUTIC EXERCISES: CPT | Performed by: PHYSICAL THERAPIST

## 2022-11-22 PROCEDURE — 97140 MANUAL THERAPY 1/> REGIONS: CPT | Performed by: PHYSICAL THERAPIST

## 2022-11-23 NOTE — PROGRESS NOTES
PT DAILY TREATMENT NOTE    Patient Name: Mac Cranker  Date:2022  : 1975  [x]  Patient  Verified  Payor: Lynette Ariza / Plan: [address] / Product Type: Workers Comp /    Total Treatment Time (min): 40  Total Timed Codes (min): 40  Referring Physician: Blake Jason MD     Treatment Area: Right shoulder    SUBJECTIVE  Patient has no new or changed complaints. Anterior shoulder pain remains    OBJECTIVE  Modality:   []  E-Stim: type _ x _ min     []att   []unatt   []w/US   []w/ice   []w/heat  []  Ultrasound: []cont   []pulse    _ W/cm2 x _  min   []1MHz   []3MHz  []  Ice pack _  min       []  Hot pack _  min       []  Other:     Therapeutic Exercise: (minutes: 15)  [x] see exercise log      Added/Changed Exercises:  []  Added:   []  Changed:       Manual Therapy: (minutes: 25)  Rotator cuff release, oscillations, traction. Soft tissue work to pectoralis minor. Posterior/inferior glenohumeral joint mobilization. Passive range of motion all planes to tolerance. Hold relax stretching for internal and external rotation. Kinesiotape was applied to the involved glenohumeral joint. 2 part taping was applied with a Y strip applied over the anterior and posterior deltoid with a middle decompression strip applied to the middle deltoid with help lift        Patient Education: [x] Review HEP    [] Progressed/Changed HEP:      Other Objective/Functional Measures:     ASSESSMENT  []  See Plan of Care  []  See progress note/recertification  [x]  Patient will continue to benefit from skilled therapy to address remaining functional deficits:     Anterior shoulder pain remains with motion as well as with palpation. She continues to guard with passive range of motion but with tension diverted we will continue to get majority of her motion.   She does continue with functional strength loss we will continue to encourage home strengthening for the interscapular as well as the rotator cuff musculature. We will follow her again later in the week. We will apply KT tape at the next visit      ICD-10-CM ICD-9-CM    1. Right shoulder pain, unspecified chronicity  M25.511 719.41       2. Stiffness of right shoulder joint  M25.611 719.51       3. S/P arthroscopy of right shoulder  Z98.890 V45.89       4.  Impingement syndrome of shoulder, right  M75.41 726.2               PLAN  [x] Progress as tolerated under current plan towards long-term goals  [] Discharge  [] Other:        PT Exercise Log         Activity/Exercise Date  11/23/22      Pulleys x     Rows (green) x      ER/IR active (yellow) x     TG roll x     Table roll x     Wand flexion/ER x     UBE x8' x     Wall walks x     IR belt stretch x     S/L ER 1lbs x     Rhythmic stabilization x     Bodyblade x                                         Zeus Green, PT 11/23/2022  10:23 AM

## 2022-11-28 NOTE — PROGRESS NOTES
ASSESSMENT/PLAN:  Below is the assessment and plan developed based on review of pertinent history, physical exam, labs, studies, and medications. 1. Impingement syndrome of shoulder, right  -     REFERRAL TO PHYSICAL THERAPY      In discussion with the patient, we considered the numerus possible diagnoses that could be contributing to their present symptoms. We also deliberated on the extensive management options that must be considered to treat their current condition. We reviewed their accessible prior medical records, diagnostic tests, and current health and employment information. We considered how these symptoms were affecting the patient´s activities of daily living as well as employment and fitness activities. The patient had various questions regarding the possible risks, benefits, complications, morbidity and mortality regarding their diagnosis and treatment options. The patients´ comorbidities were considered, and I advocated that they consider maximizing lifestyle modification through nutrition and exercise to aid in addressing their symptoms. Shared decision making yielded an understanding to move forward with conservation treatment preferences. The patient expressed understanding that if conservative management fails to alleviate the present symptoms they will return to office for re-evaluation and consideration of additional diagnostic tests and potential surgical options. In the interim, we have recommended ice, elevation, and take prescription anti-inflammatory medications along with a physician directed home exercise program. We discussed the risks and common side effects of anti-inflammatory medications and instructed the patient to discontinue the medication and contact us if they experienced any side effects. The patient was encouraged to discuss the possible side effects with their family physician or pharmacist prior to initiating any new medications.     We discussed the fact that many of the recommended treatment options presented are significantly limited by the patient´s social determinants of health. We also reviewed the circumstances surrounding the environment that they live and work which affect a wide range of health risk. We considered the limited access to appropriate educational resources regarding proper nutrition and exercise as well as the economic and social support necessary to maintain health and wellbeing. Given that the patient's symptoms are increasing in frequency and duration we have decided to prescribe physical therapy. We talked about the fact that the goal of physical therapy is for the therapist to assist in developing a program to help return the patient to full strength, function and mobility and decrease pain. We also discussed that the therapist may combine several techniques to help decrease pain. These include but are not limited to stretching, balance exercises, strength training, massage, cold and heat therapy, and electrical stimulation. Although, physical therapy is generally safe, we went over the potential risks to include the worsening of pre-existing conditions, continued pain and no improvement in flexibility, mobility, and strength. We will have the patient follow up after physical therapy to closely monitor their progress. We talked about following up sooner if therapy is not progressing on a weekly basis. Will continue her 3 times a week and physical therapy to work on regaining her full range of motion. We went over multiple exercises today in the clinic in an effort to reduce her chances of developing adhesive capsulitis. I will see her back in 1 month's time where we will consider light duty options available and hopefully progress her back to work. At this point she will be eligible for light duty in a sedentary position which they do not offer.     SUBJECTIVE/OBJECTIVE:  Kelli Brooks (: 1975) is a 48 y.o. female, patient,here for evaluation of the Surgical Follow-up (Post op follow up - right should sx on 09/26/2022)  . The patient returns today for follow-up of her right shoulder. She underwent right shoulder arthroscopy with acromioplasty, distal clavicle excision, and extensive debridement on 9/26/2022. She has been icing elevating. She has been out of her sling. She denies any numbness tingling erythema or warmth. PHYSICAL EXAM:    Examination of the operative shoulder reveals that the incisions are healing well, without evidence of drainage, erythema or warmth. There is limited range of motion secondary to discomfort. Strength is 5/5 distally. There is no tenderness at the elbow and wrist. Sensation is intact to light touch distally and there is a brisk capillary refill. Allergies   Allergen Reactions    Gabapentin Unknown (comments)       Current Outpatient Medications   Medication Sig    HYDROcodone-acetaminophen (NORCO) 7.5-325 mg per tablet Take 1 Tablet by mouth every eight (8) hours. Scheduled    pregabalin (LYRICA) 150 mg capsule Take 150 mg by mouth nightly. tiZANidine (ZANAFLEX) 4 mg tablet Take 4 mg by mouth two (2) times daily as needed for Muscle Spasm(s). ascorbic acid, vitamin C, (VITAMIN C) 500 mg tablet Take 500 mg by mouth daily. calcium carbonate (OS-BREANA) 500 mg calcium (1,250 mg) tablet Take 1 Tablet by mouth daily. ALPRAZolam (XANAX) 0.25 mg tablet Take 0.25 mg by mouth nightly as needed for Anxiety. Last filled 3/9/22 #10 for 30 days supply    escitalopram oxalate (LEXAPRO) 10 mg tablet Take 10 mg by mouth daily. lidocaine (LIDODERM) 5 % Apply patch to the affected area for 12 hours a day and remove for 12 hours a day. lidocaine (XYLOCAINE) 5 % ointment Apply to affected area twice daily as needed for pain     No current facility-administered medications for this visit.        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       Family History   Problem Relation Age of Onset    Stroke Mother     Lung Disease Father     Bleeding Prob Father     Heart Disease Father     Hypertension Father     Anesth Problems Neg Hx        Social History     Socioeconomic History    Marital status: SINGLE     Spouse name: Not on file    Number of children: Not on file    Years of education: Not on file    Highest education level: Not on file   Occupational History    Not on file   Tobacco Use    Smoking status: Former     Packs/day: 0.50     Years: 10.00     Pack years: 5.00     Types: Cigars, Cigarettes    Smokeless tobacco: Current    Tobacco comments:     SMOKES CIGARS   Vaping Use    Vaping Use: Some days    Substances: Flavoring   Substance and Sexual Activity    Alcohol use: Yes     Alcohol/week: 0.0 standard drinks     Comment: Occ    Drug use: No     Types: Prescription    Sexual activity: Never     Comment: seperated--has 3 children   Other Topics Concern    Not on file   Social History Narrative    Not on file     Social Determinants of Health     Financial Resource Strain: Not on file   Food Insecurity: Not on file   Transportation Needs: Not on file   Physical Activity: Not on file   Stress: Not on file   Social Connections: Not on file   Intimate Partner Violence: Not on file   Housing Stability: Not on file       Review of Systems    No flowsheet data found.     Vitals:  Ht 5' 5\" (1.651 m)   Wt 220 lb (99.8 kg)   LMP 01/04/2012   BMI 36.61 kg/m²    Body mass index is 36.61 kg/m². ROS    Positive for: Musculoskeletal  Last edited by Dhara Mora on 10/3/2022 10:25 AM.            An electronic signature was used to authenticate this note.   -- Melissa Peguero MD

## 2022-11-29 ENCOUNTER — OFFICE VISIT (OUTPATIENT)
Dept: ORTHOPEDIC SURGERY | Age: 47
End: 2022-11-29

## 2022-11-29 ENCOUNTER — OFFICE VISIT (OUTPATIENT)
Dept: ORTHOPEDIC SURGERY | Age: 47
End: 2022-11-29
Payer: OTHER MISCELLANEOUS

## 2022-11-29 VITALS — WEIGHT: 220 LBS | BODY MASS INDEX: 36.65 KG/M2 | HEIGHT: 65 IN

## 2022-11-29 DIAGNOSIS — Z98.890 S/P ARTHROSCOPY OF RIGHT SHOULDER: ICD-10-CM

## 2022-11-29 DIAGNOSIS — M75.41 IMPINGEMENT SYNDROME OF SHOULDER, RIGHT: Primary | ICD-10-CM

## 2022-11-29 DIAGNOSIS — M25.511 RIGHT SHOULDER PAIN, UNSPECIFIED CHRONICITY: Primary | ICD-10-CM

## 2022-11-29 DIAGNOSIS — M25.611 STIFFNESS OF RIGHT SHOULDER JOINT: ICD-10-CM

## 2022-11-29 PROCEDURE — 97110 THERAPEUTIC EXERCISES: CPT | Performed by: PHYSICAL THERAPIST

## 2022-11-29 PROCEDURE — 99024 POSTOP FOLLOW-UP VISIT: CPT | Performed by: ORTHOPAEDIC SURGERY

## 2022-11-29 PROCEDURE — 97140 MANUAL THERAPY 1/> REGIONS: CPT | Performed by: PHYSICAL THERAPIST

## 2022-11-29 NOTE — LETTER
NOTIFICATION RETURN TO WORK / SCHOOL    11/29/2022 11:23 AM    Ms. [facility] [id number]      To Whom It May Concern:    Kelli Brooks is currently under the care of [facility]. Please allow her to return to work in a sedentary position. We will continue her in physical therapy 3 times a week. We will reevaluate her in 1 month's time. At that time we will understand the light duty options available from her employer so that we may focus on returning her to work in a light-duty position. If there are questions or concerns please have the patient contact our office.         Sincerely,      Leeroy Closs, MD

## 2022-11-29 NOTE — PROGRESS NOTES
PT DAILY TREATMENT NOTE    Patient Name: Esther Wright  Date:2022  : 1975  [x]  Patient  Verified  Payor: Rylan Whitman / Plan: [address] / Product Type: Workers Comp /    Total Treatment Time (min): 45  Total Timed Codes (min): 45  Referring Physician: Amelie Adam MD     Treatment Area: Right shoulder    SUBJECTIVE  Arm got sore while cooking for Thanksgiving this past weekend    OBJECTIVE  Modality:   []  E-Stim: type _ x _ min     []att   []unatt   []w/US   []w/ice   []w/heat  []  Ultrasound: []cont   []pulse    _ W/cm2 x _  min   []1MHz   []3MHz  []  Ice pack _  min       []  Hot pack _  min       []  Other:     Therapeutic Exercise: (minutes: 30)  [x] see exercise log      Added/Changed Exercises:  []  Added:   []  Changed:       Manual Therapy: (minutes: 15)  Rotator cuff release, oscillations, traction. Soft tissue work to pectoralis minor. Posterior/inferior glenohumeral joint mobilization. Passive range of motion all planes to tolerance. Hold relax stretching for internal and external rotation. Patient Education: [x] Review HEP    [] Progressed/Changed HEP:      Other Objective/Functional Measures:     ASSESSMENT  []  See Plan of Care  []  See progress note/recertification  [x]  Patient will continue to benefit from skilled therapy to address remaining functional deficits: Forward flexion is improving, still having difficulty with abduction and especially internal rotation. Remains guarded with passive stretching. Seeing Dr. Jarrell Rangel for follow-up today. We will follow her again later this week continue progressing mobility and strength as tolerated      ICD-10-CM ICD-9-CM    1. Right shoulder pain, unspecified chronicity  M25.511 719.41       2. Stiffness of right shoulder joint  M25.611 719.51       3.  S/P arthroscopy of right shoulder  Z98.890 V45.89               PLAN  [x] Progress as tolerated under current plan towards long-term goals  [] Discharge  [] Other:        PT Exercise Log         Activity/Exercise Date  11/29/22      Pulleys x     Rows (green) x      ER/IR active (yellow) x     TG roll x     Table roll x     Wand flexion/ER x     UBE x8' x     Wall walks x     IR belt stretch x     S/L ER 1lbs x     Rhythmic stabilization x     Bodyblade x                                         Raghav Angeles, PT 11/29/2022  10:23 AM

## 2022-12-01 ENCOUNTER — OFFICE VISIT (OUTPATIENT)
Dept: ORTHOPEDIC SURGERY | Age: 47
End: 2022-12-01
Payer: OTHER MISCELLANEOUS

## 2022-12-01 DIAGNOSIS — M25.511 RIGHT SHOULDER PAIN, UNSPECIFIED CHRONICITY: Primary | ICD-10-CM

## 2022-12-01 DIAGNOSIS — Z98.890 S/P ARTHROSCOPY OF RIGHT SHOULDER: ICD-10-CM

## 2022-12-01 DIAGNOSIS — M25.611 STIFFNESS OF RIGHT SHOULDER JOINT: ICD-10-CM

## 2022-12-01 NOTE — PROGRESS NOTES
PT DAILY TREATMENT NOTE    Patient Name: Rafael Flaherty  Date:2022  : 1975  [x]  Patient  Verified  Payor: Brenna De Los Santos / Plan: [address] / Product Type: Workers Comp /    Total Treatment Time (min): 55  Total Timed Codes (min): 55  Referring Physician: Madi Mcdowell MD     Treatment Area: Right shoulder    SUBJECTIVE  Saw Dr. Osmani Duran the other day and is to increase visit frequency to 3 times per week    OBJECTIVE  Modality:   []  E-Stim: type _ x _ min     []att   []unatt   []w/US   []w/ice   []w/heat  []  Ultrasound: []cont   []pulse    _ W/cm2 x _  min   []1MHz   []3MHz  []  Ice pack _  min       []  Hot pack _  min       []  Other:     Therapeutic Exercise: (minutes: 40)  [x] see exercise log      Added/Changed Exercises:  []  Added:   []  Changed:       Manual Therapy: (minutes: 15)  Rotator cuff release, oscillations, traction. Soft tissue work to pectoralis minor. Posterior/inferior glenohumeral joint mobilization. Passive range of motion all planes to tolerance. Hold relax stretching for internal and external rotation. Patient Education: [x] Review HEP    [] Progressed/Changed HEP:      Other Objective/Functional Measures:     ASSESSMENT  []  See Plan of Care  []  See progress note/recertification  [x]  Patient will continue to benefit from skilled therapy to address remaining functional deficits:     Gradual improvements in passive mobility. Remains most limited with behind the back reach,, we will continue working to restore full functional mobility and gradually progress strengthening      ICD-10-CM ICD-9-CM    1. Right shoulder pain, unspecified chronicity  M25.511 719.41       2. Stiffness of right shoulder joint  M25.611 719.51       3.  S/P arthroscopy of right shoulder  Z98.890 V45.89               PLAN  [x] Progress as tolerated under current plan towards long-term goals  [] Discharge  [] Other:        PT Exercise Log Activity/Exercise Date  12/01/22      Pulleys x     Rows (green) x      ER/IR active (yellow) x     TG roll x     Table roll x     Wand flexion/ER x     UBE x8' x     Wall walks x     IR belt stretch x     S/L ER 1lbs x     Rhythmic stabilization x     Bodyblade x                                         Buzz Coma, PT 12/1/2022  10:23 AM

## 2022-12-08 ENCOUNTER — OFFICE VISIT (OUTPATIENT)
Dept: ORTHOPEDIC SURGERY | Age: 47
End: 2022-12-08
Payer: OTHER MISCELLANEOUS

## 2022-12-08 DIAGNOSIS — M25.511 RIGHT SHOULDER PAIN, UNSPECIFIED CHRONICITY: Primary | ICD-10-CM

## 2022-12-08 DIAGNOSIS — M25.611 STIFFNESS OF RIGHT SHOULDER JOINT: ICD-10-CM

## 2022-12-08 DIAGNOSIS — Z98.890 S/P ARTHROSCOPY OF RIGHT SHOULDER: ICD-10-CM

## 2022-12-08 NOTE — PROGRESS NOTES
PT DAILY TREATMENT NOTE    Patient Name: Eusebia Service  Date:2022  : 1975  [x]  Patient  Verified  Payor: Ana Barrientos / Plan: [address] / Product Type: Workers Comp /    Total Treatment Time (min): 55  Total Timed Codes (min): 55  Referring Physician: Maritza Madrigal MD     Treatment Area: Right shoulder    SUBJECTIVE  Left sciatic nerve is bothering her, shoulder is coming along    OBJECTIVE  Modality:   []  E-Stim: type _ x _ min     []att   []unatt   []w/US   []w/ice   []w/heat  []  Ultrasound: []cont   []pulse    _ W/cm2 x _  min   []1MHz   []3MHz  []  Ice pack _  min       []  Hot pack _  min       []  Other:     Therapeutic Exercise: (minutes: 40)  [x] see exercise log      Added/Changed Exercises:  []  Added:   []  Changed:       Manual Therapy: (minutes: 15)  Rotator cuff release, oscillations, traction. Soft tissue work to pectoralis minor. Posterior/inferior glenohumeral joint mobilization. Passive range of motion all planes to tolerance. Hold relax stretching for internal and external rotation. Patient Education: [x] Review HEP    [] Progressed/Changed HEP:      Other Objective/Functional Measures:     ASSESSMENT  []  See Plan of Care  []  See progress note/recertification  [x]  Patient will continue to benefit from skilled therapy to address remaining functional deficits:     Continue improvement in forward flexion. Remains most limited with behind the back reach but this is also improving. He does still have pain and tightness endrange motion in all planes. We will continue PT 3 times a week      ICD-10-CM ICD-9-CM    1. Right shoulder pain, unspecified chronicity  M25.511 719.41       2. Stiffness of right shoulder joint  M25.611 719.51       3.  S/P arthroscopy of right shoulder  Z98.890 V45.89               PLAN  [x] Progress as tolerated under current plan towards long-term goals  [] Discharge  [] Other:        PT Exercise Log         Activity/Exercise Date  12/08/22      Pulleys x     Rows (green) x      ER/IR active (yellow) x     TG roll x     Table roll x     Wand flexion/ER x     UBE x8' x     Wall walks x     IR belt stretch x     S/L ER 1lbs x     Rhythmic stabilization x     Bodyblade x                                         Paco Aguilar, PT 12/8/2022  10:23 AM

## 2022-12-09 ENCOUNTER — OFFICE VISIT (OUTPATIENT)
Dept: ORTHOPEDIC SURGERY | Age: 47
End: 2022-12-09
Payer: OTHER MISCELLANEOUS

## 2022-12-09 DIAGNOSIS — Z98.890 S/P ARTHROSCOPY OF RIGHT SHOULDER: ICD-10-CM

## 2022-12-09 DIAGNOSIS — M25.511 RIGHT SHOULDER PAIN, UNSPECIFIED CHRONICITY: Primary | ICD-10-CM

## 2022-12-09 DIAGNOSIS — M25.611 STIFFNESS OF RIGHT SHOULDER JOINT: ICD-10-CM

## 2022-12-09 NOTE — PROGRESS NOTES
PT DAILY TREATMENT NOTE    Patient Name: Samanta Diana  Date:2022  : 1975  [x]  Patient  Verified  Payor: Laine Shin / Plan: [id number] / Product Type: Workers Comp /    Total Treatment Time (min): 45  Total Timed Codes (min): 45  Referring Physician: Simba Hollins MD     Treatment Area: Right shoulder    SUBJECTIVE  No new complaints    OBJECTIVE  Modality:   []  E-Stim: type _ x _ min     []att   []unatt   []w/US   []w/ice   []w/heat  []  Ultrasound: []cont   []pulse    _ W/cm2 x _  min   []1MHz   []3MHz  []  Ice pack _  min       []  Hot pack _  min       []  Other:     Therapeutic Exercise: (minutes: 30)  [x] see exercise log      Added/Changed Exercises:  []  Added:   []  Changed:       Manual Therapy: (minutes: 15)  Rotator cuff release, oscillations, traction. Soft tissue work to pectoralis minor. Posterior/inferior glenohumeral joint mobilization. Passive range of motion all planes to tolerance. Hold relax stretching for internal and external rotation. Patient Education: [x] Review HEP    [] Progressed/Changed HEP:      Other Objective/Functional Measures:     ASSESSMENT  []  See Plan of Care  []  See progress note/recertification  [x]  Patient will continue to benefit from skilled therapy to address remaining functional deficits:     Remains guarded with endrange flexion. Continues to make slow but steady progress in all aspects. Her pain seems more controlled, still having some tenderness along the distal AC joint. Follow her next week      ICD-10-CM ICD-9-CM    1. Right shoulder pain, unspecified chronicity  M25.511 719.41       2. Stiffness of right shoulder joint  M25.611 719.51       3.  S/P arthroscopy of right shoulder  Z98.890 V45.89               PLAN  [x] Progress as tolerated under current plan towards long-term goals  [] Discharge  [] Other:        PT Exercise Log         Activity/Exercise Date  22      Christiane stephen Rows (green) x      ER/IR active (yellow) x     TG roll x     Table roll x     Wand flexion/ER x     UBE x8' x     Wall walks x     IR belt stretch x     S/L ER 1lbs x     Rhythmic stabilization x     Bodyblade x                                         Eula Nunes, PT 12/9/2022  10:23 AM

## 2022-12-12 ENCOUNTER — OFFICE VISIT (OUTPATIENT)
Dept: ORTHOPEDIC SURGERY | Age: 47
End: 2022-12-12
Payer: OTHER MISCELLANEOUS

## 2022-12-12 DIAGNOSIS — M25.511 RIGHT SHOULDER PAIN, UNSPECIFIED CHRONICITY: Primary | ICD-10-CM

## 2022-12-12 DIAGNOSIS — Z98.890 S/P ARTHROSCOPY OF RIGHT SHOULDER: ICD-10-CM

## 2022-12-12 DIAGNOSIS — M25.611 STIFFNESS OF RIGHT SHOULDER JOINT: ICD-10-CM

## 2022-12-12 NOTE — PROGRESS NOTES
PT DAILY TREATMENT NOTE    Patient Name: Laurel Lawson  Date:2022  : 1975  [x]  Patient  Verified  Payor: Jonathan Ross / Plan: [id number] / Product Type: Workers Comp /    Total Treatment Time (min): 55  Total Timed Codes (min): 55  Referring Physician: Jeremiah Sosa MD     Treatment Area: Right shoulder    SUBJECTIVE  Nothing new since last visit    OBJECTIVE  Modality:   []  E-Stim: type _ x _ min     []att   []unatt   []w/US   []w/ice   []w/heat  []  Ultrasound: []cont   []pulse    _ W/cm2 x _  min   []1MHz   []3MHz  []  Ice pack _  min       []  Hot pack _  min       []  Other:     Therapeutic Exercise: (minutes: 40)  [x] see exercise log      Added/Changed Exercises:  []  Added:   []  Changed:       Manual Therapy: (minutes: 15)  Rotator cuff release, oscillations, traction. Soft tissue work to pectoralis minor. Posterior/inferior glenohumeral joint mobilization. Passive range of motion all planes to tolerance. Hold relax stretching for internal and external rotation. Patient Education: [x] Review HEP    [] Progressed/Changed HEP:      Other Objective/Functional Measures:     ASSESSMENT  []  See Plan of Care  []  See progress note/recertification  [x]  Patient will continue to benefit from skilled therapy to address remaining functional deficits:     Behind the back reach remains limited to ipsilateral greater trochanter. Forward flexion is improving, still with slight compensation. We will continue focusing on mobility restoration and progress cuff/scapular strengthening      ICD-10-CM ICD-9-CM    1. Right shoulder pain, unspecified chronicity  M25.511 719.41       2. Stiffness of right shoulder joint  M25.611 719.51       3.  S/P arthroscopy of right shoulder  Z98.890 V45.89               PLAN  [x] Progress as tolerated under current plan towards long-term goals  [] Discharge  [] Other:        PT Exercise Log         Activity/Exercise Date  12/12/22      Pulleys x     Rows (green) x      ER/IR active (yellow) x     TG roll x     Table roll x     Wand flexion/ER x     UBE x8' x     Wall walks x     IR belt stretch x     S/L ER 1lbs x     Rhythmic stabilization x     Bodyblade x                                         Tucker Bond, PT 12/12/2022  10:23 AM

## 2022-12-13 ENCOUNTER — TRANSCRIBE ORDER (OUTPATIENT)
Dept: SCHEDULING | Age: 47
End: 2022-12-13

## 2022-12-13 DIAGNOSIS — K43.0 INCISIONAL HERNIA WITH OBSTRUCTION: Primary | ICD-10-CM

## 2022-12-13 DIAGNOSIS — R10.84 ABDOMINAL PAIN, GENERALIZED: ICD-10-CM

## 2022-12-13 DIAGNOSIS — Z00.00 ROUTINE GENERAL MEDICAL EXAMINATION AT A HEALTH CARE FACILITY: Primary | ICD-10-CM

## 2022-12-14 ENCOUNTER — OFFICE VISIT (OUTPATIENT)
Dept: ORTHOPEDIC SURGERY | Age: 47
End: 2022-12-14
Payer: OTHER MISCELLANEOUS

## 2022-12-14 DIAGNOSIS — M75.41 IMPINGEMENT SYNDROME OF SHOULDER, RIGHT: ICD-10-CM

## 2022-12-14 DIAGNOSIS — Z98.890 S/P ARTHROSCOPY OF RIGHT SHOULDER: ICD-10-CM

## 2022-12-14 DIAGNOSIS — M25.511 RIGHT SHOULDER PAIN, UNSPECIFIED CHRONICITY: Primary | ICD-10-CM

## 2022-12-14 DIAGNOSIS — M25.611 STIFFNESS OF RIGHT SHOULDER JOINT: ICD-10-CM

## 2022-12-14 NOTE — PROGRESS NOTES
PT DAILY TREATMENT NOTE    Patient Name: Bk Bolanos  Date:2022  : 1975  [x]  Patient  Verified  Payor: Franco Way / Plan: [id number] / Product Type: Workers Comp /    Total Treatment Time (min): 55  Total Timed Codes (min): 55  Referring Physician: Anil Rodriguez MD     Treatment Area: Right shoulder    SUBJECTIVE:    Patient reports her shoulder is not feeling well today. She reports anterior shoulder pain    OBJECTIVE  Modality:   []  E-Stim: type _ x _ min     []att   []unatt   []w/US   []w/ice   []w/heat  []  Ultrasound: []cont   []pulse    _ W/cm2 x _  min   []1MHz   []3MHz  []  Ice pack _  min       []  Hot pack _  min       []  Other:     Therapeutic Exercise: (minutes: 40)  [x] see exercise log      Added/Changed Exercises:  []  Added:   []  Changed:       Manual Therapy: (minutes: 15)  Rotator cuff release, oscillations, traction. Soft tissue work to pectoralis minor. Posterior/inferior glenohumeral joint mobilization. Passive range of motion all planes to tolerance. Hold relax stretching for internal and external rotation. Desensitization techniques to the anterior shoulder and portal sites      Patient Education: [x] Review HEP    [] Progressed/Changed HEP:      Other Objective/Functional Measures:     ASSESSMENT  []  See Plan of Care  []  See progress note/recertification  [x]  Patient will continue to benefit from skilled therapy to address remaining functional deficits:     Patient continues to guard with endrange passive range of motion continues with hypersensitivity over the anterior shoulder. We discussed self-directed massage to the pectoralis which we continue to employ in the clinic      ICD-10-CM ICD-9-CM    1. Right shoulder pain, unspecified chronicity  M25.511 719.41       2. Stiffness of right shoulder joint  M25.611 719.51       3. S/P arthroscopy of right shoulder  Z98.890 V45.89       4.  Impingement syndrome of shoulder, right  M75.41 726.2               PLAN  [x] Progress as tolerated under current plan towards long-term goals  [] Discharge  [] Other:        PT Exercise Log         Activity/Exercise Date  12/14/22      Pulleys x     Rows (green) x      ER/IR active (yellow) x     TG roll x     Table roll x     Wand flexion/ER x     UBE x8' x     Wall walks x     IR belt stretch x     S/L ER 1lbs x     Rhythmic stabilization x     Bodyblade x                                         Maritza Ramirez, PT 12/14/2022  10:23 AM

## 2022-12-19 ENCOUNTER — OFFICE VISIT (OUTPATIENT)
Dept: ORTHOPEDIC SURGERY | Age: 47
End: 2022-12-19
Payer: OTHER MISCELLANEOUS

## 2022-12-19 DIAGNOSIS — Z98.890 S/P ARTHROSCOPY OF RIGHT SHOULDER: ICD-10-CM

## 2022-12-19 DIAGNOSIS — M25.611 STIFFNESS OF RIGHT SHOULDER JOINT: ICD-10-CM

## 2022-12-19 DIAGNOSIS — M25.511 RIGHT SHOULDER PAIN, UNSPECIFIED CHRONICITY: Primary | ICD-10-CM

## 2022-12-19 NOTE — PROGRESS NOTES
PT DAILY TREATMENT NOTE    Patient Name: Kevin Lowe  Date:2022  : 1975  [x]  Patient  Verified  Payor: Hamzah Sigala / Plan: [id number] / Product Type: Workers Comp /    Total Treatment Time (min): 45  Total Timed Codes (min): 45  Referring Physician: Adolph Blair MD     Treatment Area: Right shoulder    SUBJECTIVE:    Nothing new, had to miss last visit as she was feeling sick    OBJECTIVE  Modality:   []  E-Stim: type _ x _ min     []att   []unatt   []w/US   []w/ice   []w/heat  []  Ultrasound: []cont   []pulse    _ W/cm2 x _  min   []1MHz   []3MHz  []  Ice pack _  min       []  Hot pack _  min       []  Other:     Therapeutic Exercise: (minutes: 30)  [x] see exercise log      Added/Changed Exercises:  []  Added:   []  Changed:       Manual Therapy: (minutes: 15)  Rotator cuff release, oscillations, traction. Soft tissue work to pectoralis minor. Posterior/inferior glenohumeral joint mobilization. Passive range of motion all planes to tolerance. Hold relax stretching for internal and external rotation. Desensitization techniques to the anterior shoulder and portal sites      Patient Education: [x] Review HEP    [] Progressed/Changed HEP:      Other Objective/Functional Measures:     ASSESSMENT  []  See Plan of Care  []  See progress note/recertification  [x]  Patient will continue to benefit from skilled therapy to address remaining functional deficits:     Continues with empty end feel overhead flexion. Pain over the anterior shoulder and upper arm. Continue working restore full mobility and gradually progress strengthening      ICD-10-CM ICD-9-CM    1. Right shoulder pain, unspecified chronicity  M25.511 719.41       2. Stiffness of right shoulder joint  M25.611 719.51       3.  S/P arthroscopy of right shoulder  Z98.890 V45.89               PLAN  [x] Progress as tolerated under current plan towards long-term goals  [] Discharge  [] Other: PT Exercise Log         Activity/Exercise Date  12/19/22      Pulleys x     Rows (green) x      ER/IR active (yellow) x     TG roll x     Table roll x     Wand flexion/ER x     UBE x8' x     Wall walks x     IR belt stretch x     S/L ER 1lbs x     Rhythmic stabilization x     Bodyblade x                                         Rigoberto Holland, PT 12/19/2022  10:23 AM

## 2022-12-21 ENCOUNTER — OFFICE VISIT (OUTPATIENT)
Dept: ORTHOPEDIC SURGERY | Age: 47
End: 2022-12-21
Payer: OTHER MISCELLANEOUS

## 2022-12-21 DIAGNOSIS — M25.611 STIFFNESS OF RIGHT SHOULDER JOINT: ICD-10-CM

## 2022-12-21 DIAGNOSIS — Z98.890 S/P ARTHROSCOPY OF RIGHT SHOULDER: ICD-10-CM

## 2022-12-21 DIAGNOSIS — M25.511 RIGHT SHOULDER PAIN, UNSPECIFIED CHRONICITY: Primary | ICD-10-CM

## 2022-12-21 NOTE — PROGRESS NOTES
PT DAILY TREATMENT NOTE    Patient Name: Veronique Garcia  Date:2022  : 1975  [x]  Patient  Verified  Payor: Tracee Richardson / Plan: [id number] / Product Type: Workers Comp /    Total Treatment Time (min): 55  Total Timed Codes (min): 55  Referring Physician: Michael Lorenzo MD     Treatment Area: Right shoulder    SUBJECTIVE:    Nothing new, had to miss last visit as she was feeling sick    OBJECTIVE  Modality:   []  E-Stim: type _ x _ min     []att   []unatt   []w/US   []w/ice   []w/heat  []  Ultrasound: []cont   []pulse    _ W/cm2 x _  min   []1MHz   []3MHz  []  Ice pack _  min       []  Hot pack _  min       []  Other:     Therapeutic Exercise: (minutes: 40)  [x] see exercise log      Added/Changed Exercises:  []  Added:   []  Changed:       Manual Therapy: (minutes: 15)  Rotator cuff release, oscillations, traction. Soft tissue work to pectoralis minor. Posterior/inferior glenohumeral joint mobilization. Passive range of motion all planes to tolerance. Hold relax stretching for internal and external rotation. Desensitization techniques to the anterior shoulder and portal sites      Patient Education: [x] Review HEP    [] Progressed/Changed HEP:      Other Objective/Functional Measures:     ASSESSMENT  []  See Plan of Care  []  See progress note/recertification  [x]  Patient will continue to benefit from skilled therapy to address remaining functional deficits:     Anterior shoulder pain remains intermittently with functional activities and passive stretch. ICD-10-CM ICD-9-CM    1. Right shoulder pain, unspecified chronicity  M25.511 719.41       2. Stiffness of right shoulder joint  M25.611 719.51       3.  S/P arthroscopy of right shoulder  Z98.890 V45.89                 PLAN  [x] Progress as tolerated under current plan towards long-term goals  [] Discharge  [] Other:        PT Exercise Log         Activity/Exercise Date  22      Christiane stephen Rows (green) x      ER/IR active (yellow) x     TG roll x     Table roll x     Wand flexion/ER x     UBE x8' x     Wall walks x     IR belt stretch x     S/L ER 1lbs x     Rhythmic stabilization x     Bodyblade x   PROM SHOULDER    X                                  Wendy Mederos, PT 12/21/2022  10:23 AM

## 2022-12-23 ENCOUNTER — OFFICE VISIT (OUTPATIENT)
Dept: ORTHOPEDIC SURGERY | Age: 47
End: 2022-12-23
Payer: OTHER MISCELLANEOUS

## 2022-12-23 DIAGNOSIS — M25.611 STIFFNESS OF RIGHT SHOULDER JOINT: ICD-10-CM

## 2022-12-23 DIAGNOSIS — Z98.890 S/P ARTHROSCOPY OF RIGHT SHOULDER: ICD-10-CM

## 2022-12-23 DIAGNOSIS — M25.511 RIGHT SHOULDER PAIN, UNSPECIFIED CHRONICITY: Primary | ICD-10-CM

## 2022-12-23 NOTE — PROGRESS NOTES
PT DAILY TREATMENT NOTE    Patient Name: Pooja Dias  GFAA:  : 1975  [x]  Patient  Verified  No billing information found for this encounter. Total Treatment Time (min): 55  Total Timed Codes (min): 55  Referring Physician: Shyla Cisneros MD     Treatment Area: Right shoulder    SUBJECTIVE:    Still having some pain along the front of her shoulder. OBJECTIVE  Modality:   []  E-Stim: type _ x _ min     []att   []unatt   []w/US   []w/ice   []w/heat  []  Ultrasound: []cont   []pulse    _ W/cm2 x _  min   []1MHz   []3MHz  []  Ice pack _  min       []  Hot pack _  min       []  Other:     Therapeutic Exercise: (minutes: 40)  [x] see exercise log      Added/Changed Exercises:  [x]  Added: Prone row 5lbs  []  Changed:       Manual Therapy: (minutes: 15)  Rotator cuff release, oscillations, traction. Soft tissue work to pectoralis minor. Posterior/inferior glenohumeral joint mobilization. Passive range of motion all planes to tolerance. Hold relax stretching for internal and external rotation. Desensitization techniques to the anterior shoulder and portal sites      Patient Education: [x] Review HEP    [] Progressed/Changed HEP:      Other Objective/Functional Measures:     ASSESSMENT  []  See Plan of Care  []  See progress note/recertification  [x]  Patient will continue to benefit from skilled therapy to address remaining functional deficits:     Encouraged her to do IR belt stretch at home. She continues with pain at end range of motion all planes, particularly with flexion. Overall progress very slow but steady in terms of mobility and strengthening. ICD-10-CM ICD-9-CM    1. Right shoulder pain, unspecified chronicity  M25.511 719.41       2. Stiffness of right shoulder joint  M25.611 719.51       3.  S/P arthroscopy of right shoulder  Z98.890 V45.89                 PLAN  [x] Progress as tolerated under current plan towards long-term goals  [] Discharge  [] Other:        PT Exercise Log         Activity/Exercise Date  12/23/22      Pulleys x     Rows (green) x      ER/IR active (yellow) x     TG roll x     Table roll x     Wand flexion/ER x     UBE x8' x     Wall walks x     IR belt stretch x     S/L ER 1lbs x     Rhythmic stabilization x     Bodyblade x   PROM SHOULDER    X     Prone row 5lbs x                            Rebeca Yee, PT 12/23/2022  10:23 AM

## 2022-12-27 ENCOUNTER — OFFICE VISIT (OUTPATIENT)
Dept: ORTHOPEDIC SURGERY | Age: 47
End: 2022-12-27

## 2022-12-27 ENCOUNTER — HOSPITAL ENCOUNTER (OUTPATIENT)
Dept: CT IMAGING | Age: 47
Discharge: HOME | End: 2022-12-27
Payer: MEDICAID

## 2022-12-27 DIAGNOSIS — Z98.890 S/P ARTHROSCOPY OF RIGHT SHOULDER: ICD-10-CM

## 2022-12-27 DIAGNOSIS — R10.84 ABDOMINAL PAIN, GENERALIZED: ICD-10-CM

## 2022-12-27 DIAGNOSIS — K43.0 INCISIONAL HERNIA WITH OBSTRUCTION: ICD-10-CM

## 2022-12-27 DIAGNOSIS — M25.611 STIFFNESS OF RIGHT SHOULDER JOINT: ICD-10-CM

## 2022-12-27 DIAGNOSIS — M25.511 RIGHT SHOULDER PAIN, UNSPECIFIED CHRONICITY: Primary | ICD-10-CM

## 2022-12-27 PROCEDURE — 97110 THERAPEUTIC EXERCISES: CPT | Performed by: PHYSICAL THERAPIST

## 2022-12-27 PROCEDURE — 97140 MANUAL THERAPY 1/> REGIONS: CPT | Performed by: PHYSICAL THERAPIST

## 2022-12-27 PROCEDURE — 74177 CT ABD & PELVIS W/CONTRAST: CPT

## 2022-12-27 PROCEDURE — 74011000636 HC RX REV CODE- 636: Performed by: RADIOLOGY

## 2022-12-27 RX ADMIN — IOPAMIDOL 100 ML: 755 INJECTION, SOLUTION INTRAVENOUS at 16:07

## 2022-12-27 NOTE — PROGRESS NOTES
PT DAILY TREATMENT NOTE    Patient Name: Juan Pablo Godinez  Date:2022  : 1975  [x]  Patient  Verified  No billing information found for this encounter. Total Treatment Time (min): 55  Total Timed Codes (min): 55  Referring Physician: Marvin Fabian MD     Treatment Area: Right shoulder    SUBJECTIVE:  Continues with complaints of weakness and pain over the anterior shoulder    OBJECTIVE  Modality:   []  E-Stim: type _ x _ min     []att   []unatt   []w/US   []w/ice   []w/heat  []  Ultrasound: []cont   []pulse    _ W/cm2 x _  min   []1MHz   []3MHz  []  Ice pack _  min       []  Hot pack _  min       []  Other:     Therapeutic Exercise: (minutes: 40)  [x] see exercise log      Added/Changed Exercises:  [x]  Added: Prone row 5lbs  []  Changed:       Manual Therapy: (minutes: 15)  Rotator cuff release, oscillations, traction. Soft tissue work to pectoralis minor. Posterior/inferior glenohumeral joint mobilization. Passive range of motion all planes to tolerance. Hold relax stretching for internal and external rotation. Desensitization techniques to the anterior shoulder and portal sites      Patient Education: [x] Review HEP    [] Progressed/Changed HEP:      Other Objective/Functional Measures:     ASSESSMENT  []  See Plan of Care  []  See progress note/recertification  [x]  Patient will continue to benefit from skilled therapy to address remaining functional deficits:       Continues to have guarding with endrange motion and palpation around the anterior portion of her shoulder with the patient's attention is diverted she is less sensitive to palpation over the shoulder. She does continue to guard with endrange motion functionally she is continue to advance with strength      ICD-10-CM ICD-9-CM    1. Right shoulder pain, unspecified chronicity  M25.511 719.41       2. Stiffness of right shoulder joint  M25.611 719.51       3.  S/P arthroscopy of right shoulder  Z98.890 V45.89 PLAN  [x] Progress as tolerated under current plan towards long-term goals  [] Discharge  [] Other:        PT Exercise Log         Activity/Exercise Date  12/27/22      Pulleys x     Rows (green) x      ER/IR active (yellow) x     TG roll x     Table roll x     Wand flexion/ER x     UBE x8' x     Wall walks x     IR belt stretch x     S/L ER 1lbs x     Rhythmic stabilization x     Bodyblade x   PROM SHOULDER    X     Prone row 5lbs x                            Mali Racine, PT 12/27/2022  10:23 AM

## 2022-12-28 NOTE — PROGRESS NOTES
ASSESSMENT/PLAN:  Below is the assessment and plan developed based on review of pertinent history, physical exam, labs, studies, and medications. 1. Impingement syndrome of shoulder, right  -     REFERRAL TO PHYSICAL THERAPY      In discussion with the patient, we considered the numerus possible diagnoses that could be contributing to their present symptoms. We also deliberated on the extensive management options that must be considered to treat their current condition. We reviewed their accessible prior medical records, diagnostic tests, and current health and employment information. We considered how these symptoms were affecting the patient´s activities of daily living as well as employment and fitness activities. The patient had various questions regarding the possible risks, benefits, complications, morbidity and mortality regarding their diagnosis and treatment options. The patients´ comorbidities were considered, and I advocated that they consider maximizing lifestyle modification through nutrition and exercise to aid in addressing their symptoms. Shared decision making yielded an understanding to move forward with conservation treatment preferences. The patient expressed understanding that if conservative management fails to alleviate the present symptoms they will return to office for re-evaluation and consideration of additional diagnostic tests and potential surgical options. In the interim, we have recommended ice, elevation, and take prescription anti-inflammatory medications along with a physician directed home exercise program. We discussed the risks and common side effects of anti-inflammatory medications and instructed the patient to discontinue the medication and contact us if they experienced any side effects. The patient was encouraged to discuss the possible side effects with their family physician or pharmacist prior to initiating any new medications.     We discussed the fact that many of the recommended treatment options presented are significantly limited by the patient´s social determinants of health. We also reviewed the circumstances surrounding the environment that they live and work which affect a wide range of health risk. We considered the limited access to appropriate educational resources regarding proper nutrition and exercise as well as the economic and social support necessary to maintain health and wellbeing. Given that the patient's symptoms are increasing in frequency and duration we have decided to prescribe physical therapy. We talked about the fact that the goal of physical therapy is for the therapist to assist in developing a program to help return the patient to full strength, function and mobility and decrease pain. We also discussed that the therapist may combine several techniques to help decrease pain. These include but are not limited to stretching, balance exercises, strength training, massage, cold and heat therapy, and electrical stimulation. Although, physical therapy is generally safe, we went over the potential risks to include the worsening of pre-existing conditions, continued pain and no improvement in flexibility, mobility, and strength. We will have the patient follow up after physical therapy to closely monitor their progress. We talked about following up sooner if therapy is not progressing on a weekly basis. Will continue her 3 times a week and physical therapy to work on regaining her full range of motion. We went over multiple exercises today in the clinic in an effort to reduce her chances of developing adhesive capsulitis. I will see her back in 1 month's time where we will consider light duty options available and hopefully progress her back to work. At this point she will be eligible for light duty in a sedentary position which they do not offer.     SUBJECTIVE/OBJECTIVE:  Rafael Flaherty (: 1975) is a 48 y.o. female, patient,here for evaluation of the Surgical Follow-up (Post op follow up - right should sx on 09/26/2022)  . The patient returns today for follow-up of her right shoulder. She underwent right shoulder arthroscopy with acromioplasty, distal clavicle excision, and extensive debridement on 9/26/2022. She has been icing elevating. She has been out of her sling. She denies any numbness tingling erythema or warmth. PHYSICAL EXAM:    Examination of the operative shoulder reveals that the incisions are healing well, without evidence of drainage, erythema or warmth. There is limited range of motion secondary to discomfort. Strength is 5/5 distally. There is no tenderness at the elbow and wrist. Sensation is intact to light touch distally and there is a brisk capillary refill. Allergies   Allergen Reactions    Gabapentin Unknown (comments)       Current Outpatient Medications   Medication Sig    HYDROcodone-acetaminophen (NORCO) 7.5-325 mg per tablet Take 1 Tablet by mouth every eight (8) hours. Scheduled    pregabalin (LYRICA) 150 mg capsule Take 150 mg by mouth nightly. tiZANidine (ZANAFLEX) 4 mg tablet Take 4 mg by mouth two (2) times daily as needed for Muscle Spasm(s). ascorbic acid, vitamin C, (VITAMIN C) 500 mg tablet Take 500 mg by mouth daily. calcium carbonate (OS-BREANA) 500 mg calcium (1,250 mg) tablet Take 1 Tablet by mouth daily. ALPRAZolam (XANAX) 0.25 mg tablet Take 0.25 mg by mouth nightly as needed for Anxiety. Last filled 3/9/22 #10 for 30 days supply    escitalopram oxalate (LEXAPRO) 10 mg tablet Take 10 mg by mouth daily. lidocaine (LIDODERM) 5 % Apply patch to the affected area for 12 hours a day and remove for 12 hours a day. lidocaine (XYLOCAINE) 5 % ointment Apply to affected area twice daily as needed for pain     No current facility-administered medications for this visit.        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       Family History   Problem Relation Age of Onset    Stroke Mother     Lung Disease Father     Bleeding Prob Father     Heart Disease Father     Hypertension Father     Anesth Problems Neg Hx        Social History     Socioeconomic History    Marital status: SINGLE     Spouse name: Not on file    Number of children: Not on file    Years of education: Not on file    Highest education level: Not on file   Occupational History    Not on file   Tobacco Use    Smoking status: Former     Packs/day: 0.50     Years: 10.00     Pack years: 5.00     Types: Cigars, Cigarettes    Smokeless tobacco: Current    Tobacco comments:     SMOKES CIGARS   Vaping Use    Vaping Use: Some days    Substances: Flavoring   Substance and Sexual Activity    Alcohol use: Yes     Alcohol/week: 0.0 standard drinks     Comment: Occ    Drug use: No     Types: Prescription    Sexual activity: Never     Comment: seperated--has 3 children   Other Topics Concern    Not on file   Social History Narrative    Not on file     Social Determinants of Health     Financial Resource Strain: Not on file   Food Insecurity: Not on file   Transportation Needs: Not on file   Physical Activity: Not on file   Stress: Not on file   Social Connections: Not on file   Intimate Partner Violence: Not on file   Housing Stability: Not on file       Review of Systems    No flowsheet data found.     Vitals:  Ht 5' 5\" (1.651 m)   Wt 220 lb (99.8 kg)   LMP 01/04/2012   BMI 36.61 kg/m²    Body mass index is 36.61 kg/m². ROS    Positive for: Musculoskeletal  Last edited by Melina Arce on 10/3/2022 10:25 AM.            An electronic signature was used to authenticate this note.   -- Raji Escobar MD

## 2022-12-29 ENCOUNTER — OFFICE VISIT (OUTPATIENT)
Dept: ORTHOPEDIC SURGERY | Age: 47
End: 2022-12-29

## 2022-12-29 DIAGNOSIS — Z98.890 S/P ARTHROSCOPY OF RIGHT SHOULDER: ICD-10-CM

## 2022-12-29 DIAGNOSIS — M25.611 STIFFNESS OF RIGHT SHOULDER JOINT: ICD-10-CM

## 2022-12-29 DIAGNOSIS — M25.511 RIGHT SHOULDER PAIN, UNSPECIFIED CHRONICITY: Primary | ICD-10-CM

## 2022-12-29 PROCEDURE — 97110 THERAPEUTIC EXERCISES: CPT | Performed by: PHYSICAL THERAPIST

## 2022-12-29 PROCEDURE — 97140 MANUAL THERAPY 1/> REGIONS: CPT | Performed by: PHYSICAL THERAPIST

## 2022-12-29 NOTE — PROGRESS NOTES
PT DAILY TREATMENT NOTE    Patient Name: Gaviota Talbot  Date:2022  : 1975  [x]  Patient  Verified  No billing information found for this encounter. Total Treatment Time (min): 55  Total Timed Codes (min): 55  Referring Physician: Mary Roqeu MD     Treatment Area: Right shoulder    SUBJECTIVE:  Shoulder is more sore as she had to have a CT scan the other day and hold her arm up above her head    OBJECTIVE  Modality:   []  E-Stim: type _ x _ min     []att   []unatt   []w/US   []w/ice   []w/heat  []  Ultrasound: []cont   []pulse    _ W/cm2 x _  min   []1MHz   []3MHz  []  Ice pack _  min       []  Hot pack _  min       []  Other:     Therapeutic Exercise: (minutes: 40)  [x] see exercise log      Added/Changed Exercises:  []  Added:   []  Changed:       Manual Therapy: (minutes: 15)  Rotator cuff release, oscillations, traction. Soft tissue work to pectoralis minor. Posterior/inferior glenohumeral joint mobilization. Passive range of motion all planes to tolerance. Hold relax stretching for internal and external rotation. Desensitization techniques to the anterior shoulder and portal sites      Patient Education: [x] Review HEP    [] Progressed/Changed HEP:      Other Objective/Functional Measures:     ASSESSMENT  []  See Plan of Care  []  See progress note/recertification  [x]  Patient will continue to benefit from skilled therapy to address remaining functional deficits:       Continues with compensation forward elevation but is becoming more functional.  Endrange motion loss remains in all planes as well as periscapular weakness. Follow again tomorrow      ICD-10-CM ICD-9-CM    1. Right shoulder pain, unspecified chronicity  M25.511 719.41       2. Stiffness of right shoulder joint  M25.611 719.51       3.  S/P arthroscopy of right shoulder  Z98.890 V45.89                 PLAN  [x] Progress as tolerated under current plan towards long-term goals  [] Discharge  [] Other:        PT Exercise Log         Activity/Exercise Date  12/29/22      Pulleys x     Rows (green) x      ER/IR active (yellow) x     TG roll x     Table roll x     Wand flexion/ER x     UBE x8' x     Wall walks x     IR belt stretch x     S/L ER 1lbs x     Rhythmic stabilization x     Bodyblade x   PROM SHOULDER    X     Prone row 5lbs x                            Stevenson Lo, PT 12/29/2022  10:23 AM

## 2022-12-30 ENCOUNTER — OFFICE VISIT (OUTPATIENT)
Dept: ORTHOPEDIC SURGERY | Age: 47
End: 2022-12-30

## 2022-12-30 VITALS — WEIGHT: 220 LBS | HEIGHT: 65 IN | BODY MASS INDEX: 36.65 KG/M2

## 2022-12-30 DIAGNOSIS — M25.511 RIGHT SHOULDER PAIN, UNSPECIFIED CHRONICITY: Primary | ICD-10-CM

## 2022-12-30 DIAGNOSIS — Z98.890 S/P ARTHROSCOPY OF RIGHT SHOULDER: ICD-10-CM

## 2022-12-30 DIAGNOSIS — M25.611 STIFFNESS OF RIGHT SHOULDER JOINT: ICD-10-CM

## 2022-12-30 DIAGNOSIS — M25.611 STIFFNESS OF RIGHT SHOULDER JOINT: Primary | ICD-10-CM

## 2022-12-30 PROCEDURE — 99214 OFFICE O/P EST MOD 30 MIN: CPT | Performed by: ORTHOPAEDIC SURGERY

## 2022-12-30 PROCEDURE — 97110 THERAPEUTIC EXERCISES: CPT | Performed by: PHYSICAL THERAPIST

## 2022-12-30 PROCEDURE — 97140 MANUAL THERAPY 1/> REGIONS: CPT | Performed by: PHYSICAL THERAPIST

## 2022-12-30 NOTE — PROGRESS NOTES
PT DAILY TREATMENT NOTE    Patient Name: Mac Cranker  EKBP:  : 1975  [x]  Patient  Verified  No billing information found for this encounter. Total Treatment Time (min): 55  Total Timed Codes (min): 55  Referring Physician: Blake Jason MD     Treatment Area: Right shoulder    SUBJECTIVE:  Shoulder feels sore this morning    OBJECTIVE  Modality:   []  E-Stim: type _ x _ min     []att   []unatt   []w/US   []w/ice   []w/heat  []  Ultrasound: []cont   []pulse    _ W/cm2 x _  min   []1MHz   []3MHz  []  Ice pack _  min       []  Hot pack _  min       []  Other:     Therapeutic Exercise: (minutes: 40)  [x] see exercise log      Added/Changed Exercises:  []  Added:   []  Changed:       Manual Therapy: (minutes: 15)  Rotator cuff release, oscillations, traction. Soft tissue work to pectoralis minor. Posterior/inferior glenohumeral joint mobilization. Passive range of motion all planes to tolerance. Hold relax stretching for internal and external rotation. Desensitization techniques to the anterior shoulder and portal sites      Patient Education: [x] Review HEP    [] Progressed/Changed HEP:      Other Objective/Functional Measures:     ASSESSMENT  []  See Plan of Care  []  See progress note/recertification  [x]  Patient will continue to benefit from skilled therapy to address remaining functional deficits: Forward flexion and behind back reach motion loss remains. She is still fairly guarded with endrange stretching. Seeing Dr. Chip Gonzalez for follow-up today, we will progress plan of care accordingly next week      ICD-10-CM ICD-9-CM    1. Right shoulder pain, unspecified chronicity  M25.511 719.41       2. Stiffness of right shoulder joint  M25.611 719.51       3.  S/P arthroscopy of right shoulder  Z98.890 V45.89                 PLAN  [x] Progress as tolerated under current plan towards long-term goals  [] Discharge  [] Other:        PT Exercise Log         Activity/Exercise Date  12/30/22      Pulleys x     Rows (green) x      ER/IR active (yellow) x     TG roll x     Table roll x     Wand flexion/ER x     UBE x8' x     Wall walks x     IR belt stretch x     S/L ER 1lbs x     Rhythmic stabilization x     Bodyblade x   PROM SHOULDER    X     Prone row 5lbs x                            Rigoberto Holland, PT 12/30/2022  10:23 AM

## 2022-12-30 NOTE — LETTER
NOTIFICATION RETURN TO WORK / SCHOOL    12/30/2022 11:17 AM    Ms. Jodi Hernandez [phone]      To Whom It May Concern:    Pooja Dias is currently under the care of [facility]. Is allow her to return to work with no lifting greater than 5 pounds with the right upper extremity and no lifting above shoulder level. We will see her back in 1 month's time. If there are questions or concerns please have the patient contact our office.         Sincerely,      Taurus Gomez MD

## 2023-01-06 ENCOUNTER — OFFICE VISIT (OUTPATIENT)
Dept: ORTHOPEDIC SURGERY | Age: 48
End: 2023-01-06
Payer: OTHER MISCELLANEOUS

## 2023-01-06 DIAGNOSIS — M25.611 STIFFNESS OF RIGHT SHOULDER JOINT: ICD-10-CM

## 2023-01-06 DIAGNOSIS — M25.511 RIGHT SHOULDER PAIN, UNSPECIFIED CHRONICITY: Primary | ICD-10-CM

## 2023-01-06 DIAGNOSIS — Z98.890 S/P ARTHROSCOPY OF RIGHT SHOULDER: ICD-10-CM

## 2023-01-06 NOTE — PROGRESS NOTES
PT DAILY TREATMENT NOTE    Patient Name: Deangelo Cottrell  Date:2023  : 1975  [x]  Patient  Verified  No billing information found for this encounter. Total Treatment Time (min): 55  Total Timed Codes (min): 55  Referring Physician: Candace Galarza MD     Treatment Area: Right shoulder    SUBJECTIVE:  Saw Dr. Gladys Navarro the other day, she is to continue PT. Shoulder is pretty sore this morning    OBJECTIVE  Modality:   []  E-Stim: type _ x _ min     []att   []unatt   []w/US   []w/ice   []w/heat  []  Ultrasound: []cont   []pulse    _ W/cm2 x _  min   []1MHz   []3MHz  []  Ice pack _  min       []  Hot pack _  min       []  Other:     Therapeutic Exercise: (minutes: 40)  [x] see exercise log      Added/Changed Exercises:  []  Added:   []  Changed:       Manual Therapy: (minutes: 15)  Rotator cuff release, oscillations, traction. Soft tissue work to pectoralis minor. Posterior/inferior glenohumeral joint mobilization. Passive range of motion all planes to tolerance. Hold relax stretching for internal and external rotation. Desensitization techniques to the anterior shoulder and portal sites      Patient Education: [x] Review HEP    [] Progressed/Changed HEP:      Other Objective/Functional Measures:     ASSESSMENT  []  See Plan of Care  []  See progress note/recertification  [x]  Patient will continue to benefit from skilled therapy to address remaining functional deficits:       Improved passive flexion today. She continues with moderate soreness but overall is making progress with mobility. continue working on rotator cuff strength and endrange motion. ICD-10-CM ICD-9-CM    1. Right shoulder pain, unspecified chronicity  M25.511 719.41       2. Stiffness of right shoulder joint  M25.611 719.51       3.  S/P arthroscopy of right shoulder  Z98.890 V45.89                 PLAN  [x] Progress as tolerated under current plan towards long-term goals  [] Discharge  [] Other:        PT Exercise Log Activity/Exercise Date  01/06/23      Pulleys x     Rows (green) x      ER/IR active (yellow) x     TG roll x     Table roll x     Wand flexion/ER x     UBE x8' x     Wall walks x     IR belt stretch x     S/L ER 1lbs x     Rhythmic stabilization x     Bodyblade x   PROM SHOULDER    X     Prone row 5lbs x                            Sierra Hadley, PT 1/6/2023  10:23 AM

## 2023-01-11 ENCOUNTER — OFFICE VISIT (OUTPATIENT)
Dept: ORTHOPEDIC SURGERY | Age: 48
End: 2023-01-11

## 2023-01-11 DIAGNOSIS — M25.511 RIGHT SHOULDER PAIN, UNSPECIFIED CHRONICITY: Primary | ICD-10-CM

## 2023-01-11 DIAGNOSIS — Z98.890 S/P ARTHROSCOPY OF RIGHT SHOULDER: ICD-10-CM

## 2023-01-11 DIAGNOSIS — M25.611 STIFFNESS OF RIGHT SHOULDER JOINT: ICD-10-CM

## 2023-01-11 DIAGNOSIS — M75.41 IMPINGEMENT SYNDROME OF SHOULDER, RIGHT: ICD-10-CM

## 2023-01-11 PROCEDURE — 97110 THERAPEUTIC EXERCISES: CPT | Performed by: PHYSICAL THERAPIST

## 2023-01-11 PROCEDURE — 97140 MANUAL THERAPY 1/> REGIONS: CPT | Performed by: PHYSICAL THERAPIST

## 2023-01-11 NOTE — PROGRESS NOTES
PT DAILY TREATMENT NOTE    Patient Name: Nichole Luna  Date:2023  : 1975  [x]  Patient  Verified  No billing information found for this encounter. Total Treatment Time (min): 55  Total Timed Codes (min): 55  Referring Physician: Raissa Tirado MD     Treatment Area: Right shoulder    SUBJECTIVE:  Shoulder still sore. Discomfort reaching out to the side and behind    OBJECTIVE  Modality:   []  E-Stim: type _ x _ min     []att   []unatt   []w/US   []w/ice   []w/heat  []  Ultrasound: []cont   []pulse    _ W/cm2 x _  min   []1MHz   []3MHz  []  Ice pack _  min       []  Hot pack _  min       []  Other:     Therapeutic Exercise: (minutes: 40)  [x] see exercise log      Added/Changed Exercises:  []  Added:   []  Changed:       Manual Therapy: (minutes: 15)  Rotator cuff release, oscillations, traction. Soft tissue work to pectoralis minor. Posterior/inferior glenohumeral joint mobilization. Passive range of motion all planes to tolerance. Hold relax stretching for internal and external rotation. Desensitization techniques to the anterior shoulder and portal sites      Patient Education: [x] Review HEP    [] Progressed/Changed HEP:      Other Objective/Functional Measures:     ASSESSMENT  []  See Plan of Care  []  See progress note/recertification  [x]  Patient will continue to benefit from skilled therapy to address remaining functional deficits:       Improved passive flexion today. She continues with moderate soreness but overall is making progress with mobility. continue working on rotator cuff strength and endrange motion. ICD-10-CM ICD-9-CM    1. Right shoulder pain, unspecified chronicity  M25.511 719.41       2. Stiffness of right shoulder joint  M25.611 719.51       3. Impingement syndrome of shoulder, right  M75.41 726.2       4.  S/P arthroscopy of right shoulder  Z98.890 V45.89                 PLAN  [x] Progress as tolerated under current plan towards long-term goals  [] Discharge  [] Other:        PT Exercise Log         Activity/Exercise Date  01/11/23      Pulleys x     Rows (green) x      ER/IR active (yellow) x     TG roll x     Table roll x     Wand flexion/ER x     UBE x8' x     Wall walks x     IR belt stretch x     S/L ER 1lbs x     Rhythmic stabilization x     Bodyblade x   PROM SHOULDER    X     Prone row 5lbs x                            Shelia Lee, PT 1/11/2023  10:23 AM

## 2023-01-16 ENCOUNTER — OFFICE VISIT (OUTPATIENT)
Dept: ORTHOPEDIC SURGERY | Age: 48
End: 2023-01-16

## 2023-01-16 DIAGNOSIS — M25.511 RIGHT SHOULDER PAIN, UNSPECIFIED CHRONICITY: Primary | ICD-10-CM

## 2023-01-16 DIAGNOSIS — M25.611 STIFFNESS OF RIGHT SHOULDER JOINT: ICD-10-CM

## 2023-01-16 PROCEDURE — 97110 THERAPEUTIC EXERCISES: CPT | Performed by: PHYSICAL THERAPIST

## 2023-01-16 PROCEDURE — 97140 MANUAL THERAPY 1/> REGIONS: CPT | Performed by: PHYSICAL THERAPIST

## 2023-01-16 NOTE — PROGRESS NOTES
PT DAILY TREATMENT NOTE    Patient Name: Lenore Walker  Date:2023  : 1975  [x]  Patient  Verified  No billing information found for this encounter. Total Treatment Time (min): 55  Total Timed Codes (min): 55  Referring Physician: Aurelia Toribio MD     Treatment Area: Right shoulder    SUBJECTIVE:  No changes. OBJECTIVE  Modality:   []  E-Stim: type _ x _ min     []att   []unatt   []w/US   []w/ice   []w/heat  []  Ultrasound: []cont   []pulse    _ W/cm2 x _  min   []1MHz   []3MHz  []  Ice pack _  min       []  Hot pack _  min       []  Other:     Therapeutic Exercise: (minutes: 40)  [x] see exercise log      Added/Changed Exercises:  []  Added:   []  Changed:       Manual Therapy: (minutes: 15)  Rotator cuff release, oscillations, traction. Soft tissue work to pectoralis minor. Posterior/inferior glenohumeral joint mobilization. Passive range of motion all planes to tolerance. Hold relax stretching for internal and external rotation. Patient Education: [x] Review HEP    [] Progressed/Changed HEP:      Other Objective/Functional Measures:     ASSESSMENT  []  See Plan of Care  []  See progress note/recertification  [x]  Patient will continue to benefit from skilled therapy to address remaining functional deficits:       Improved passive flexion today. She continues with moderate soreness but overall is making progress with mobility. continue working on rotator cuff strength and endrange motion. ICD-10-CM ICD-9-CM    1. Right shoulder pain, unspecified chronicity  M25.511 719.41       2.  Stiffness of right shoulder joint  M25.611 719.51                 PLAN  [x] Progress as tolerated under current plan towards long-term goals  [] Discharge  [] Other:        PT Exercise Log         Activity/Exercise Date  23      Pulleys x     Rows (green) x      ER/IR active (yellow) x     TG roll x     Table roll x     Wand flexion/ER x     UBE x8' x     Wall walks x     IR belt stretch x S/L ER 1lbs x     Rhythmic stabilization x     Bodyblade x   PROM SHOULDER    X     Prone row 5lbs x                            Luke Neely, PT 1/16/2023  10:23 AM

## 2023-01-18 ENCOUNTER — OFFICE VISIT (OUTPATIENT)
Dept: ORTHOPEDIC SURGERY | Age: 48
End: 2023-01-18

## 2023-01-18 DIAGNOSIS — M25.611 STIFFNESS OF RIGHT SHOULDER JOINT: ICD-10-CM

## 2023-01-18 DIAGNOSIS — M75.41 IMPINGEMENT SYNDROME OF SHOULDER, RIGHT: ICD-10-CM

## 2023-01-18 DIAGNOSIS — M25.511 RIGHT SHOULDER PAIN, UNSPECIFIED CHRONICITY: Primary | ICD-10-CM

## 2023-01-18 PROCEDURE — 97110 THERAPEUTIC EXERCISES: CPT | Performed by: PHYSICAL THERAPIST

## 2023-01-18 PROCEDURE — 97140 MANUAL THERAPY 1/> REGIONS: CPT | Performed by: PHYSICAL THERAPIST

## 2023-01-18 NOTE — PROGRESS NOTES
PT DAILY TREATMENT NOTE    Patient Name: Theodora Smyth  Date:2023  : 1975  [x]  Patient  Verified  No billing information found for this encounter. Total Treatment Time (min): 55  Total Timed Codes (min): 55  Referring Physician: May Tracy MD     Treatment Area: Right shoulder    SUBJECTIVE:  Stomach bothering her today. Shoulder doing ok. OBJECTIVE  Modality:   []  E-Stim: type _ x _ min     []att   []unatt   []w/US   []w/ice   []w/heat  []  Ultrasound: []cont   []pulse    _ W/cm2 x _  min   []1MHz   []3MHz  []  Ice pack _  min       []  Hot pack _  min       []  Other:     Therapeutic Exercise: (minutes: 40)  [x] see exercise log      Added/Changed Exercises:  []  Added:   []  Changed:       Manual Therapy: (minutes: 15)  Rotator cuff release, oscillations, traction. Soft tissue work to pectoralis minor. Posterior/inferior glenohumeral joint mobilization. Passive range of motion all planes to tolerance. Hold relax stretching for internal and external rotation. Patient Education: [x] Review HEP    [] Progressed/Changed HEP:      Other Objective/Functional Measures:     ASSESSMENT  []  See Plan of Care  []  See progress note/recertification  [x]  Patient will continue to benefit from skilled therapy to address remaining functional deficits:       Improved passive flexion today. She continues with moderate soreness but overall is making progress with mobility. Continue working on rotator cuff strength and endrange motion. ICD-10-CM ICD-9-CM    1. Right shoulder pain, unspecified chronicity  M25.511 719.41       2. Stiffness of right shoulder joint  M25.611 719.51       3.  Impingement syndrome of shoulder, right  M75.41 726.2                 PLAN  [x] Progress as tolerated under current plan towards long-term goals  [] Discharge  [] Other:        PT Exercise Log         Activity/Exercise Date  23      Pulleys x     Rows (green) x      ER/IR active (yellow) x     TG roll x     Table roll x     Wand flexion/ER x     UBE x8' x     Wall walks x     IR belt stretch x     S/L ER 1lbs x     Rhythmic stabilization x     Bodyblade x   PROM SHOULDER    X     Prone row 5lbs held                            Llewellyn, PT 1/18/2023  10:23 AM

## 2023-01-19 DIAGNOSIS — M75.41 IMPINGEMENT SYNDROME OF SHOULDER, RIGHT: ICD-10-CM

## 2023-01-19 RX ORDER — LIDOCAINE 50 MG/G
OINTMENT TOPICAL
Qty: 100 G | Refills: 2 | Status: SHIPPED | OUTPATIENT
Start: 2023-01-19

## 2023-01-23 ENCOUNTER — OFFICE VISIT (OUTPATIENT)
Dept: ORTHOPEDIC SURGERY | Age: 48
End: 2023-01-23
Payer: OTHER MISCELLANEOUS

## 2023-01-23 DIAGNOSIS — M75.41 IMPINGEMENT SYNDROME OF SHOULDER, RIGHT: Primary | ICD-10-CM

## 2023-01-23 DIAGNOSIS — M25.611 STIFFNESS OF RIGHT SHOULDER JOINT: ICD-10-CM

## 2023-01-23 DIAGNOSIS — M25.511 RIGHT SHOULDER PAIN, UNSPECIFIED CHRONICITY: ICD-10-CM

## 2023-01-23 PROCEDURE — 97110 THERAPEUTIC EXERCISES: CPT | Performed by: PHYSICAL THERAPIST

## 2023-01-23 PROCEDURE — 97140 MANUAL THERAPY 1/> REGIONS: CPT | Performed by: PHYSICAL THERAPIST

## 2023-01-23 NOTE — PROGRESS NOTES
PT DAILY TREATMENT NOTE    Patient Name: Taylor Newman  Date:2023  : 1975  [x]  Patient  Verified  No billing information found for this encounter. Total Treatment Time (min): 55  Total Timed Codes (min): 55  Referring Physician: Lizeth Srivastava MD     Treatment Area: Right shoulder    SUBJECTIVE:  About the same in regards to pain. Does see some improvement with ROM    OBJECTIVE  Modality:   []  E-Stim: type _ x _ min     []att   []unatt   []w/US   []w/ice   []w/heat  []  Ultrasound: []cont   []pulse    _ W/cm2 x _  min   []1MHz   []3MHz  []  Ice pack _  min       []  Hot pack _  min       []  Other:     Therapeutic Exercise: (minutes: 40)  [x] see exercise log      Added/Changed Exercises:  []  Added:   []  Changed:       Manual Therapy: (minutes: 10)  Rotator cuff release, oscillations, traction. Soft tissue work to pectoralis minor. Posterior/inferior glenohumeral joint mobilization. Passive range of motion all planes to tolerance. Hold relax stretching for internal and external rotation. Patient Education: [x] Review HEP    [] Progressed/Changed HEP:      Other Objective/Functional Measures:     ASSESSMENT  []  See Plan of Care  []  See progress note/recertification  [x]  Patient will continue to benefit from skilled therapy to address remaining functional deficits:       Improved passive flexion today. She continues with moderate soreness but overall is making progress with mobility. Continue working on rotator cuff strength and endrange motion. ICD-10-CM ICD-9-CM    1. Impingement syndrome of shoulder, right  M75.41 726.2       2. Right shoulder pain, unspecified chronicity  M25.511 719.41       3.  Stiffness of right shoulder joint  M25.611 719.51                 PLAN  [x] Progress as tolerated under current plan towards long-term goals  [] Discharge  [] Other:        PT Exercise Log         Activity/Exercise Date  23      Pulleys x     Rows (green) x      ER/IR active (yellow) x     TG roll x     Table roll x     Wand flexion/ER x     UBE x8' x     Wall walks x     IR belt stretch x     S/L ER 1lbs x     Rhythmic stabilization x     Bodyblade x   PROM SHOULDER    X                                Cljana Moffett, PT 1/23/2023  10:23 AM

## 2023-01-25 ENCOUNTER — OFFICE VISIT (OUTPATIENT)
Dept: ORTHOPEDIC SURGERY | Age: 48
End: 2023-01-25
Payer: OTHER MISCELLANEOUS

## 2023-01-25 DIAGNOSIS — M25.511 RIGHT SHOULDER PAIN, UNSPECIFIED CHRONICITY: Primary | ICD-10-CM

## 2023-01-25 DIAGNOSIS — M25.611 STIFFNESS OF RIGHT SHOULDER JOINT: ICD-10-CM

## 2023-01-25 DIAGNOSIS — Z98.890 S/P ARTHROSCOPY OF RIGHT SHOULDER: ICD-10-CM

## 2023-01-25 PROCEDURE — 97110 THERAPEUTIC EXERCISES: CPT | Performed by: PHYSICAL THERAPIST

## 2023-01-25 PROCEDURE — 97140 MANUAL THERAPY 1/> REGIONS: CPT | Performed by: PHYSICAL THERAPIST

## 2023-01-25 NOTE — PROGRESS NOTES
PT DAILY TREATMENT NOTE    Patient Name: Catrachita Scales  Date:2023  : 1975  [x]  Patient  Verified  No billing information found for this encounter. Total Treatment Time (min): 45  Total Timed Codes (min): 45  Referring Physician: Kemar Meza MD     Treatment Area: Right shoulder    SUBJECTIVE:  Accidentally slept on her arm the other night and has been sore since    OBJECTIVE  Modality:   []  E-Stim: type _ x _ min     []att   []unatt   []w/US   []w/ice   []w/heat  []  Ultrasound: []cont   []pulse    _ W/cm2 x _  min   []1MHz   []3MHz  []  Ice pack _  min       []  Hot pack _  min       []  Other:     Therapeutic Exercise: (minutes: 30)  [x] see exercise log      Added/Changed Exercises:  []  Added:   []  Changed:       Manual Therapy: (minutes: 15)  Rotator cuff release, oscillations, traction. Soft tissue work to pectoralis minor. Posterior/inferior glenohumeral joint mobilization. Passive range of motion all planes to tolerance. Hold relax stretching for internal and external rotation. Patient Education: [x] Review HEP    [] Progressed/Changed HEP:      Other Objective/Functional Measures:     ASSESSMENT  []  See Plan of Care  []  See progress note/recertification  [x]  Patient will continue to benefit from skilled therapy to address remaining functional deficits:       Her forward flexion has improved substantially, still with some compensation. Continues to respond well to low load prolonged stretching, particularly with ER. Seeing Dr. Kenna Bush for follow-up at the end of this week, follow her again next week      ICD-10-CM ICD-9-CM    1. Right shoulder pain, unspecified chronicity  M25.511 719.41       2. Stiffness of right shoulder joint  M25.611 719.51       3.  S/P arthroscopy of right shoulder  Z98.890 V45.89                 PLAN  [x] Progress as tolerated under current plan towards long-term goals  [] Discharge  [] Other:        PT Exercise Log         Activity/Exercise Date  01/25/23      Pulleys x     Rows (green) x      ER/IR active (yellow) x     TG roll x     Table roll x     Wand flexion/ER x     UBE x8' x     Wall walks x     IR belt stretch x     S/L ER 1lbs x     Rhythmic stabilization x     Bodyblade x   PROM SHOULDER    X                                Mancil Dills, PT 1/25/2023  10:23 AM

## 2023-01-25 NOTE — PROGRESS NOTES
ASSESSMENT/PLAN:  Below is the assessment and plan developed based on review of pertinent history, physical exam, labs, studies, and medications. 1. Impingement syndrome of shoulder, right  -     REFERRAL TO PHYSICAL THERAPY      In discussion with the patient, we considered the numerus possible diagnoses that could be contributing to their present symptoms. We also deliberated on the extensive management options that must be considered to treat their current condition. We reviewed their accessible prior medical records, diagnostic tests, and current health and employment information. We considered how these symptoms were affecting the patient´s activities of daily living as well as employment and fitness activities. The patient had various questions regarding the possible risks, benefits, complications, morbidity and mortality regarding their diagnosis and treatment options. The patients´ comorbidities were considered, and I advocated that they consider maximizing lifestyle modification through nutrition and exercise to aid in addressing their symptoms. Shared decision making yielded an understanding to move forward with conservation treatment preferences. The patient expressed understanding that if conservative management fails to alleviate the present symptoms they will return to office for re-evaluation and consideration of additional diagnostic tests and potential surgical options. In the interim, we have recommended ice, elevation, and take prescription anti-inflammatory medications along with a physician directed home exercise program. We discussed the risks and common side effects of anti-inflammatory medications and instructed the patient to discontinue the medication and contact us if they experienced any side effects. The patient was encouraged to discuss the possible side effects with their family physician or pharmacist prior to initiating any new medications.     We discussed the fact that many of the recommended treatment options presented are significantly limited by the patient´s social determinants of health. We also reviewed the circumstances surrounding the environment that they live and work which affect a wide range of health risk. We considered the limited access to appropriate educational resources regarding proper nutrition and exercise as well as the economic and social support necessary to maintain health and wellbeing. Given that the patient's symptoms are increasing in frequency and duration we have decided to prescribe physical therapy. We talked about the fact that the goal of physical therapy is for the therapist to assist in developing a program to help return the patient to full strength, function and mobility and decrease pain. We also discussed that the therapist may combine several techniques to help decrease pain. These include but are not limited to stretching, balance exercises, strength training, massage, cold and heat therapy, and electrical stimulation. Although, physical therapy is generally safe, we went over the potential risks to include the worsening of pre-existing conditions, continued pain and no improvement in flexibility, mobility, and strength. We will have the patient follow up after physical therapy to closely monitor their progress. We talked about following up sooner if therapy is not progressing on a weekly basis. Will continue her 3 times a week and physical therapy to work on regaining her full range of motion. We stressed the importance of daily home exercises to progress her range of motion and to avoid adhesive capsulitis in the shoulder. She was given a note for work that she may return with light duty restrictions including only a 5 pound weight limit to the right upper extremity. She is written a fresh prescription for physical therapy for 3 times a week for the next 6 weeks.   We will see her back in 4 weeks time for repeat evaluation of her range of motion. SUBJECTIVE/OBJECTIVE:  Tori Rothman (: 1975) is a 48 y.o. female, patient,here for evaluation of the Surgical Follow-up (Post op follow up - right should sx on 2022)  . The patient returns today for follow-up of her right shoulder. She underwent right shoulder arthroscopy with acromioplasty, distal clavicle excision, and extensive debridement on 2022. She has been icing elevating. She has been out of her sling. She denies any numbness tingling erythema or warmth. She has been progressing slowly with her range of motion. PHYSICAL EXAM:    Examination of the operative shoulder reveals that the incisions are healing well, without evidence of drainage, erythema or warmth. There is limited range of motion secondary to discomfort. Strength is 5/5 distally. There is no tenderness at the elbow and wrist. Sensation is intact to light touch distally and there is a brisk capillary refill. Allergies   Allergen Reactions    Gabapentin Unknown (comments)       Current Outpatient Medications   Medication Sig    HYDROcodone-acetaminophen (NORCO) 7.5-325 mg per tablet Take 1 Tablet by mouth every eight (8) hours. Scheduled    pregabalin (LYRICA) 150 mg capsule Take 150 mg by mouth nightly. tiZANidine (ZANAFLEX) 4 mg tablet Take 4 mg by mouth two (2) times daily as needed for Muscle Spasm(s). ascorbic acid, vitamin C, (VITAMIN C) 500 mg tablet Take 500 mg by mouth daily. calcium carbonate (OS-BREANA) 500 mg calcium (1,250 mg) tablet Take 1 Tablet by mouth daily. ALPRAZolam (XANAX) 0.25 mg tablet Take 0.25 mg by mouth nightly as needed for Anxiety. Last filled 3/9/22 #10 for 30 days supply    escitalopram oxalate (LEXAPRO) 10 mg tablet Take 10 mg by mouth daily. lidocaine (LIDODERM) 5 % Apply patch to the affected area for 12 hours a day and remove for 12 hours a day.     lidocaine (XYLOCAINE) 5 % ointment Apply to affected area twice daily as needed for pain     No current facility-administered medications for this visit. 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       Family History   Problem Relation Age of Onset    Stroke Mother     Lung Disease Father     Bleeding Prob Father     Heart Disease Father     Hypertension Father     Anesth Problems Neg Hx        Social History     Socioeconomic History    Marital status: SINGLE     Spouse name: Not on file    Number of children: Not on file    Years of education: Not on file    Highest education level: Not on file   Occupational History    Not on file   Tobacco Use    Smoking status: Former     Packs/day: 0.50     Years: 10.00     Pack years: 5.00     Types: Cigars, Cigarettes    Smokeless tobacco: Current    Tobacco comments:     SMOKES CIGARS   Vaping Use    Vaping Use: Some days    Substances: Flavoring   Substance and Sexual Activity    Alcohol use: Yes     Alcohol/week: 0.0 standard drinks     Comment:  Occ    Drug use: No     Types: Prescription    Sexual activity: Never     Comment: seperated--has 3 children   Other Topics Concern    Not on file   Social History Narrative    Not on file     Social Determinants of Health     Financial Resource Strain: Not on file   Food Insecurity: Not on file   Transportation Needs: Not on file   Physical Activity: Not on file   Stress: Not on file   Social Connections: Not on file   Intimate Partner Violence: Not on file Housing Stability: Not on file       Review of Systems    No flowsheet data found. Vitals:  Ht 5' 5\" (1.651 m)   Wt 220 lb (99.8 kg)   LMP 01/04/2012   BMI 36.61 kg/m²    Body mass index is 36.61 kg/m². ROS    Positive for: Musculoskeletal  Last edited by Kellie Cullen on 10/3/2022 10:25 AM.            An electronic signature was used to authenticate this note.   -- Dania Sharp MD

## 2023-01-27 ENCOUNTER — OFFICE VISIT (OUTPATIENT)
Dept: ORTHOPEDIC SURGERY | Age: 48
End: 2023-01-27
Payer: OTHER MISCELLANEOUS

## 2023-01-27 VITALS — HEIGHT: 65 IN | BODY MASS INDEX: 36.65 KG/M2 | WEIGHT: 220 LBS

## 2023-01-27 DIAGNOSIS — M75.41 IMPINGEMENT SYNDROME OF SHOULDER, RIGHT: Primary | ICD-10-CM

## 2023-01-27 PROCEDURE — 99214 OFFICE O/P EST MOD 30 MIN: CPT | Performed by: ORTHOPAEDIC SURGERY

## 2023-01-27 NOTE — LETTER
1/27/2023 11:17 AM    Ms. Jodi Hernandez [phone]      To whom it may concern,    Patient is allowed to return to work with 5-pound lifting restrictions to the right upper extremity, until follow-up evaluation.           Sincerely,      Zechariah Sorensen MD

## 2023-02-02 ENCOUNTER — OFFICE VISIT (OUTPATIENT)
Dept: ORTHOPEDIC SURGERY | Age: 48
End: 2023-02-02
Payer: MEDICAID

## 2023-02-02 DIAGNOSIS — M25.611 STIFFNESS OF RIGHT SHOULDER JOINT: ICD-10-CM

## 2023-02-02 DIAGNOSIS — M25.511 RIGHT SHOULDER PAIN, UNSPECIFIED CHRONICITY: Primary | ICD-10-CM

## 2023-02-02 DIAGNOSIS — Z98.890 S/P ARTHROSCOPY OF RIGHT SHOULDER: ICD-10-CM

## 2023-02-02 NOTE — PROGRESS NOTES
PT DAILY TREATMENT NOTE    Patient Name: Nichole Luna  Date:2023  : 1975  [x]  Patient  Verified  Payor: [address] [place],9D / Plan: 1 [facility] / Product Type: Managed Care Medicaid /    Total Treatment Time (min): 45  Total Timed Codes (min): 45  Referring Physician: Raissa Tirado MD     Treatment Area: Right shoulder    SUBJECTIVE:  Shoulder still sore    OBJECTIVE  Modality:   []  E-Stim: type _ x _ min     []att   []unatt   []w/US   []w/ice   []w/heat  []  Ultrasound: []cont   []pulse    _ W/cm2 x _  min   []1MHz   []3MHz  []  Ice pack _  min       []  Hot pack _  min       []  Other:     Therapeutic Exercise: (minutes: 30)  [x] see exercise log      Added/Changed Exercises:  []  Added:   []  Changed:       Manual Therapy: (minutes: 15)  Rotator cuff release, oscillations, traction. Soft tissue work to pectoralis minor. Posterior/inferior glenohumeral joint mobilization. Passive range of motion all planes to tolerance. Hold relax stretching for internal and external rotation. Patient Education: [x] Review HEP    [] Progressed/Changed HEP:      Other Objective/Functional Measures:     ASSESSMENT  []  See Plan of Care  []  See progress note/recertification  [x]  Patient will continue to benefit from skilled therapy to address remaining functional deficits:       Motion continues to improve, near full motion but just tight with endrange ER/IR. She remains most limited with behind the back reach. Still having localized tenderness to the [facility] joint. Continue working restore full mobility and progress strengthening next week      ICD-10-CM ICD-9-CM    1. Right shoulder pain, unspecified chronicity  M25.511 719.41       2. Stiffness of right shoulder joint  M25.611 719.51       3.  S/P arthroscopy of right shoulder  Z98.890 V45.89                 PLAN  [x] Progress as tolerated under current plan towards long-term goals  [] Discharge  [] Other:        PT Exercise Log         Activity/Exercise Date  02/02/23      Pulleys x     Rows (green) x      ER/IR active (yellow) x     TG roll x     Table roll x     Wand flexion/ER x     UBE x8' x     Wall walks x     IR belt stretch x     S/L ER 1lbs x     Rhythmic stabilization x     Bodyblade x   PROM SHOULDER    X                                Amos Aragon, PT 2/2/2023  10:23 AM

## 2023-02-06 ENCOUNTER — OFFICE VISIT (OUTPATIENT)
Dept: ORTHOPEDIC SURGERY | Age: 48
End: 2023-02-06
Payer: OTHER MISCELLANEOUS

## 2023-02-06 DIAGNOSIS — M25.611 STIFFNESS OF RIGHT SHOULDER JOINT: ICD-10-CM

## 2023-02-06 DIAGNOSIS — M25.511 RIGHT SHOULDER PAIN, UNSPECIFIED CHRONICITY: Primary | ICD-10-CM

## 2023-02-06 DIAGNOSIS — Z98.890 S/P ARTHROSCOPY OF RIGHT SHOULDER: ICD-10-CM

## 2023-02-06 DIAGNOSIS — M75.41 IMPINGEMENT SYNDROME OF SHOULDER, RIGHT: ICD-10-CM

## 2023-02-06 PROCEDURE — 97110 THERAPEUTIC EXERCISES: CPT | Performed by: PHYSICAL THERAPIST

## 2023-02-06 PROCEDURE — 97140 MANUAL THERAPY 1/> REGIONS: CPT | Performed by: PHYSICAL THERAPIST

## 2023-02-06 NOTE — PROGRESS NOTES
PT DAILY TREATMENT NOTE    Patient Name: Sendy Martinez  Date:2023  : 1975  [x]  Patient  Verified  Payor: Eddie [employer] / Plan: [id number] / Product Type: Workers Comp /    Total Treatment Time (min): 45  Total Timed Codes (min): 45  Referring Physician: John Regan MD     Treatment Area: Right shoulder    SUBJECTIVE:  Shoulder still sore. OBJECTIVE  Modality:   []  E-Stim: type _ x _ min     []att   []unatt   []w/US   []w/ice   []w/heat  []  Ultrasound: []cont   []pulse    _ W/cm2 x _  min   []1MHz   []3MHz  []  Ice pack _  min       []  Hot pack _  min       []  Other:     Therapeutic Exercise: (minutes: 30)  [x] see exercise log      Added/Changed Exercises:  []  Added:   []  Changed:       Manual Therapy: (minutes: 15)  Rotator cuff release, oscillations, traction. Soft tissue work to pectoralis minor. Posterior/inferior glenohumeral joint mobilization. Passive range of motion all planes to tolerance. Hold relax stretching for internal and external rotation. Patient Education: [x] Review HEP    [] Progressed/Changed HEP:      Other Objective/Functional Measures:     ASSESSMENT  []  See Plan of Care  []  See progress note/recertification  [x]  Patient will continue to benefit from skilled therapy to address remaining functional deficits:       Motion continues to improve, near full motion but just tight with endrange ER/IR. She remains most limited with behind the back reach. Still having localized tenderness to the [facility] joint. ICD-10-CM ICD-9-CM    1. Right shoulder pain, unspecified chronicity  M25.511 719.41       2. Stiffness of right shoulder joint  M25.611 719.51       3. S/P arthroscopy of right shoulder  Z98.890 V45.89       4.  Impingement syndrome of shoulder, right  M75.41 726.2                 PLAN  [x] Progress as tolerated under current plan towards long-term goals  [] Discharge  [] Other:        PT Exercise Log Activity/Exercise Date  02/06/23      Pulleys x     Rows (green) x      ER/IR active (yellow) x     TG roll x     Table roll x     Wand flexion/ER x     UBE x8' x     Wall walks x     IR belt stretch x     S/L ER 1lbs x     Rhythmic stabilization x     Bodyblade x   PROM SHOULDER    X                                Jacqueline Howard, PT 2/6/2023  10:23 AM

## 2023-02-07 NOTE — PROGRESS NOTES
ASSESSMENT/PLAN:  Below is the assessment and plan developed based on review of pertinent history, physical exam, labs, studies, and medications. 1. Impingement syndrome of shoulder, right  -     REFERRAL TO PHYSICAL THERAPY      In discussion with the patient, we considered the numerus possible diagnoses that could be contributing to their present symptoms. We also deliberated on the extensive management options that must be considered to treat their current condition. We reviewed their accessible prior medical records, diagnostic tests, and current health and employment information. We considered how these symptoms were affecting the patient´s activities of daily living as well as employment and fitness activities. The patient had various questions regarding the possible risks, benefits, complications, morbidity and mortality regarding their diagnosis and treatment options. The patients´ comorbidities were considered, and I advocated that they consider maximizing lifestyle modification through nutrition and exercise to aid in addressing their symptoms. Shared decision making yielded an understanding to move forward with conservation treatment preferences. The patient expressed understanding that if conservative management fails to alleviate the present symptoms they will return to office for re-evaluation and consideration of additional diagnostic tests and potential surgical options. In the interim, we have recommended ice, elevation, and take prescription anti-inflammatory medications along with a physician directed home exercise program. We discussed the risks and common side effects of anti-inflammatory medications and instructed the patient to discontinue the medication and contact us if they experienced any side effects. The patient was encouraged to discuss the possible side effects with their family physician or pharmacist prior to initiating any new medications.     We discussed the fact that many of the recommended treatment options presented are significantly limited by the patient´s social determinants of health. We also reviewed the circumstances surrounding the environment that they live and work which affect a wide range of health risk. We considered the limited access to appropriate educational resources regarding proper nutrition and exercise as well as the economic and social support necessary to maintain health and wellbeing. Given that the patient's symptoms are increasing in frequency and duration we have decided to prescribe physical therapy. We talked about the fact that the goal of physical therapy is for the therapist to assist in developing a program to help return the patient to full strength, function and mobility and decrease pain. We also discussed that the therapist may combine several techniques to help decrease pain. These include but are not limited to stretching, balance exercises, strength training, massage, cold and heat therapy, and electrical stimulation. Although, physical therapy is generally safe, we went over the potential risks to include the worsening of pre-existing conditions, continued pain and no improvement in flexibility, mobility, and strength. We will have the patient follow up after physical therapy to closely monitor their progress. We talked about following up sooner if therapy is not progressing on a weekly basis. Will continue her 3 times a week and physical therapy to work on regaining her full range of motion. We stressed the importance of daily home exercises to progress her range of motion and to avoid adhesive capsulitis in the shoulder. She was given a note for work that she may return with light duty restrictions including only a 5 pound weight limit to the right upper extremity. She is written a fresh prescription for physical therapy for 3 times a week for the next 6 weeks.   We will see her back in 4 weeks time for repeat evaluation of her range of motion. We will start her on some Keflex today just to make sure she does not develop a superficial infection. SUBJECTIVE/OBJECTIVE:  Deyanira Giordano (: 1975) is a 48 y.o. female, patient,here for evaluation of the Surgical Follow-up (Post op follow up - right should sx on 2022)  . The patient returns today for follow-up of her right shoulder. She underwent right shoulder arthroscopy with acromioplasty, distal clavicle excision, and extensive debridement on 2022. She has been icing elevating. She has been out of her sling. She denies any numbness tingling erythema or warmth. She has been progressing slowly with her range of motion. She reports she does have some drainage out of the anterior portal but no erythema or warmth. No fever chills or night sweats. PHYSICAL EXAM:    Examination of the operative shoulder reveals that the incisions are healing well, without evidence of drainage, erythema or warmth. There is limited range of motion secondary to discomfort. Strength is 5/5 distally. There is no tenderness at the elbow and wrist. Sensation is intact to light touch distally and there is a brisk capillary refill. She does have a scab over the anterior superior portal.  I am unable to express any purulence or fluid today. Allergies   Allergen Reactions    Gabapentin Unknown (comments)       Current Outpatient Medications   Medication Sig    HYDROcodone-acetaminophen (NORCO) 7.5-325 mg per tablet Take 1 Tablet by mouth every eight (8) hours. Scheduled    pregabalin (LYRICA) 150 mg capsule Take 150 mg by mouth nightly. tiZANidine (ZANAFLEX) 4 mg tablet Take 4 mg by mouth two (2) times daily as needed for Muscle Spasm(s). ascorbic acid, vitamin C, (VITAMIN C) 500 mg tablet Take 500 mg by mouth daily. calcium carbonate (OS-BREANA) 500 mg calcium (1,250 mg) tablet Take 1 Tablet by mouth daily.     ALPRAZolam (XANAX) 0.25 mg tablet Take 0.25 mg by mouth nightly as needed for Anxiety. Last filled 3/9/22 #10 for 30 days supply    escitalopram oxalate (LEXAPRO) 10 mg tablet Take 10 mg by mouth daily. lidocaine (LIDODERM) 5 % Apply patch to the affected area for 12 hours a day and remove for 12 hours a day. lidocaine (XYLOCAINE) 5 % ointment Apply to affected area twice daily as needed for pain     No current facility-administered medications for this visit. 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       Family History   Problem Relation Age of Onset    Stroke Mother     Lung Disease Father     Bleeding Prob Father     Heart Disease Father     Hypertension Father     Anesth Problems Neg Hx        Social History     Socioeconomic History    Marital status: SINGLE     Spouse name: Not on file    Number of children: Not on file    Years of education: Not on file    Highest education level: Not on file   Occupational History    Not on file   Tobacco Use    Smoking status: Former     Packs/day: 0.50     Years: 10.00     Pack years: 5.00     Types: Cigars, Cigarettes    Smokeless tobacco: Current    Tobacco comments:     SMOKES CIGARS   Vaping Use    Vaping Use: Some days    Substances: Flavoring   Substance and Sexual Activity    Alcohol use: Yes     Alcohol/week: 0.0 standard drinks     Comment:  Occ    Drug use: No     Types: Prescription    Sexual activity: Never     Comment: seperated--has 3 children   Other Topics Concern    Not on file   Social History Narrative    Not on file     Social Determinants of Health     Financial Resource Strain: Not on file   Food Insecurity: Not on file   Transportation Needs: Not on file   Physical Activity: Not on file   Stress: Not on file   Social Connections: Not on file   Intimate Partner Violence: Not on file   Housing Stability: Not on file       Review of Systems    No flowsheet data found. Vitals:  Ht 5' 5\" (1.651 m)   Wt 220 lb (99.8 kg)   LMP 01/04/2012   BMI 36.61 kg/m²    Body mass index is 36.61 kg/m². ROS    Positive for: Musculoskeletal  Last edited by Kyra Godfrey on 10/3/2022 10:25 AM.            An electronic signature was used to authenticate this note.   -- Corry Herring MD

## 2023-02-08 ENCOUNTER — OFFICE VISIT (OUTPATIENT)
Dept: ORTHOPEDIC SURGERY | Age: 48
End: 2023-02-08

## 2023-02-08 ENCOUNTER — OFFICE VISIT (OUTPATIENT)
Dept: ORTHOPEDIC SURGERY | Age: 48
End: 2023-02-08
Payer: OTHER MISCELLANEOUS

## 2023-02-08 VITALS — HEIGHT: 65 IN | WEIGHT: 220 LBS | BODY MASS INDEX: 36.65 KG/M2

## 2023-02-08 DIAGNOSIS — M25.511 RIGHT SHOULDER PAIN, UNSPECIFIED CHRONICITY: Primary | ICD-10-CM

## 2023-02-08 DIAGNOSIS — M25.611 STIFFNESS OF RIGHT SHOULDER JOINT: ICD-10-CM

## 2023-02-08 DIAGNOSIS — Z98.890 S/P ARTHROSCOPY OF RIGHT SHOULDER: ICD-10-CM

## 2023-02-08 DIAGNOSIS — M25.611 STIFFNESS OF RIGHT SHOULDER JOINT: Primary | ICD-10-CM

## 2023-02-08 PROCEDURE — 97140 MANUAL THERAPY 1/> REGIONS: CPT | Performed by: PHYSICAL THERAPIST

## 2023-02-08 PROCEDURE — 97110 THERAPEUTIC EXERCISES: CPT | Performed by: PHYSICAL THERAPIST

## 2023-02-08 PROCEDURE — 99214 OFFICE O/P EST MOD 30 MIN: CPT | Performed by: ORTHOPAEDIC SURGERY

## 2023-02-08 RX ORDER — CEPHALEXIN 500 MG/1
500 CAPSULE ORAL 4 TIMES DAILY
Qty: 30 CAPSULE | Refills: 0 | Status: SHIPPED | OUTPATIENT
Start: 2023-02-08

## 2023-02-08 NOTE — PROGRESS NOTES
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 She is to increase visit frequency to 3 times per week. OBJECTIVE  Modality:   []  E-Stim: type _ x _ min     []att   []unatt   []w/US   []w/ice   []w/heat  []  Ultrasound: []cont   []pulse    _ W/cm2 x _  min   []1MHz   []3MHz  []  Ice pack _  min       []  Hot pack _  min       []  Other:     Therapeutic Exercise: (minutes: 40)  [x] see exercise log      Added/Changed Exercises:  []  Added:   []  Changed:       Manual Therapy: (minutes: 15)  Rotator cuff release, oscillations, traction. Soft tissue work to pectoralis minor. Posterior/inferior glenohumeral joint mobilization. Passive range of motion all planes to tolerance. Hold relax stretching for internal and external rotation. Patient Education: [x] Review HEP    [] Progressed/Changed HEP:      Other Objective/Functional Measures:     ASSESSMENT  []  See Plan of Care  []  See progress note/recertification  [x]  Patient will continue to benefit from skilled therapy to address remaining functional deficits:       On antibiotics now to help open portal site heal. Continued gradual improvement in both active and passive mobility. We will continue working with structured supervised exercise and manual interventions to regain full motion and strength for return to work. ICD-10-CM ICD-9-CM    1. Right shoulder pain, unspecified chronicity  M25.511 719.41       2. Stiffness of right shoulder joint  M25.611 719.51       3.  S/P arthroscopy of right shoulder  Z98.890 V45.89 PLAN  [x] Progress as tolerated under current plan towards long-term goals  [] Discharge  [] Other:        PT Exercise Log         Activity/Exercise Date  02/08/23      Pulleys x     Rows (green) x      ER/IR active (yellow) x     TG roll x     Table roll x     Wand flexion/ER 3lbs x     UBE x8' x     Wall walks x     IR belt stretch x     S/L ER 1lbs x     Rhythmic stabilization x     Bodyblade x   PROM SHOULDER    X                                Archana Ramirez, PT 2/8/2023  10:23 AM

## 2023-02-08 NOTE — LETTER
2/8/2023 9:48 AM    Ms. 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 Please allow the patient to return to work with no lifting greater than 5 pounds with the right upper extremity, and no lifting overhead. She will continue to participate in physical therapy 3 times a week. We will see her back in 1 month's time. If there are questions or concerns please have the patient contact our office.                                                        Sincerely,      Tara Early MD

## 2023-02-09 DIAGNOSIS — Z98.890 S/P ARTHROSCOPY OF RIGHT SHOULDER: Primary | ICD-10-CM

## 2023-02-09 RX ORDER — CLINDAMYCIN HYDROCHLORIDE 300 MG/1
300 CAPSULE ORAL 3 TIMES DAILY
Qty: 30 CAPSULE | Refills: 0 | Status: SHIPPED | OUTPATIENT
Start: 2023-02-09

## 2023-02-09 NOTE — TELEPHONE ENCOUNTER
Patient phones office stating the pharmacy could not fill the keflex due to the pills being coated with a substance containing red dye in which the patient is allergic to. Please send in cleocin.

## 2023-02-22 ENCOUNTER — OFFICE VISIT (OUTPATIENT)
Dept: ORTHOPEDIC SURGERY | Age: 48
End: 2023-02-22
Payer: OTHER MISCELLANEOUS

## 2023-02-22 DIAGNOSIS — M25.611 STIFFNESS OF RIGHT SHOULDER JOINT: ICD-10-CM

## 2023-02-22 DIAGNOSIS — M25.511 RIGHT SHOULDER PAIN, UNSPECIFIED CHRONICITY: Primary | ICD-10-CM

## 2023-02-22 DIAGNOSIS — Z98.890 S/P ARTHROSCOPY OF RIGHT SHOULDER: ICD-10-CM

## 2023-02-22 PROCEDURE — 97140 MANUAL THERAPY 1/> REGIONS: CPT | Performed by: PHYSICAL THERAPIST

## 2023-02-22 PROCEDURE — 97110 THERAPEUTIC EXERCISES: CPT | Performed by: PHYSICAL THERAPIST

## 2023-02-22 NOTE — PROGRESS NOTES
PT DAILY TREATMENT NOTE    Patient Name: Evelyn Sheth  Date:2023  : 1975  [x]  Patient  Verified  Payor: Navdeep Ruano / Plan: [id number] / Product Type: Workers Comp /    Total Treatment Time (min): 55  Total Timed Codes (min): 55  Referring Physician: Darwin Brady MD     Treatment Area: Right shoulder    SUBJECTIVE:  Had to miss last week's visits as she was sick. She reports she has been working on her shoulder, particularly with behind the back reach    OBJECTIVE  Modality:   []  E-Stim: type _ x _ min     []att   []unatt   []w/US   []w/ice   []w/heat  []  Ultrasound: []cont   []pulse    _ W/cm2 x _  min   []1MHz   []3MHz  []  Ice pack _  min       []  Hot pack _  min       []  Other:     Therapeutic Exercise: (minutes: 40)  [x] see exercise log      Added/Changed Exercises:  []  Added:   []  Changed:       Manual Therapy: (minutes: 15)  Rotator cuff release, oscillations, traction. Soft tissue work to pectoralis minor. Posterior/inferior glenohumeral joint mobilization. Passive range of motion all planes to tolerance. Hold relax stretching for internal and external rotation. Patient Education: [x] Review HEP    [] Progressed/Changed HEP:      Other Objective/Functional Measures:     ASSESSMENT  []  See Plan of Care  []  See progress note/recertification  [x]  Patient will continue to benefit from skilled therapy to address remaining functional deficits:       Superior portal site still healing. She remains tight with endrange motion in all planes. She does states she is doing her home exercises throughout the day. Follow her again later this week      ICD-10-CM ICD-9-CM    1. Right shoulder pain, unspecified chronicity  M25.511 719.41       2. Stiffness of right shoulder joint  M25.611 719.51       3.  S/P arthroscopy of right shoulder  Z98.890 V45.89                 PLAN  [x] Progress as tolerated under current plan towards long-term goals  [] Discharge  [] Other:        PT Exercise Log         Activity/Exercise Date  02/22/23      Pulleys x     Rows (green) x      ER/IR active (yellow) x     TG roll x     Table roll x     Wand flexion/ER 3lbs x     UBE x8' x     Wall walks x     IR belt stretch x     S/L ER 1lbs x     Rhythmic stabilization x     Bodyblade x   PROM SHOULDER    X                                Peggy Betancourt, PT 2/22/2023  10:23 AM

## 2023-02-24 ENCOUNTER — OFFICE VISIT (OUTPATIENT)
Dept: ORTHOPEDIC SURGERY | Age: 48
End: 2023-02-24

## 2023-02-24 DIAGNOSIS — Z98.890 S/P ARTHROSCOPY OF RIGHT SHOULDER: ICD-10-CM

## 2023-02-24 DIAGNOSIS — M25.511 RIGHT SHOULDER PAIN, UNSPECIFIED CHRONICITY: Primary | ICD-10-CM

## 2023-02-24 DIAGNOSIS — M25.611 STIFFNESS OF RIGHT SHOULDER JOINT: ICD-10-CM

## 2023-02-24 NOTE — PROGRESS NOTES
PT DAILY TREATMENT NOTE    Patient Name: Brijesh Ramos  UQQR:  : 1975  [x]  Patient  Verified  Payor: Genny Figueroa / Plan: [id number] / Product Type: Workers Comp /    Total Treatment Time (min): 45  Total Timed Codes (min): 45  Referring Physician: Mann Woodson MD     Treatment Area: Right shoulder    SUBJECTIVE:  Patient states that her shoulder still painful, most notably on the front of her shoulder. OBJECTIVE  Modality:   []  E-Stim: type _ x _ min     []att   []unatt   []w/US   []w/ice   []w/heat  []  Ultrasound: []cont   []pulse    _ W/cm2 x _  min   []1MHz   []3MHz  []  Ice pack _  min       []  Hot pack _  min       []  Other:     Therapeutic Exercise: (minutes: 30)  [x] see exercise log      Added/Changed Exercises:  []  Added:   []  Changed:       Manual Therapy: (minutes: 15)  Rotator cuff release, oscillations, traction. Soft tissue work to pectoralis minor. Posterior/inferior glenohumeral joint mobilization. Passive range of motion all planes to tolerance. Hold relax stretching for internal and external rotation. Patient Education: [x] Review HEP    [] Progressed/Changed HEP:      Other Objective/Functional Measures:     ASSESSMENT  []  See Plan of Care  []  See progress note/recertification  [x]  Patient will continue to benefit from skilled therapy to address remaining functional deficits:       Patient has made good progress with passive mobility. We will continue plan with focus on progressing scapular and rotator cuff strengthening to achieve long-term goals. ICD-10-CM ICD-9-CM    1. Right shoulder pain, unspecified chronicity  M25.511 719.41       2. Stiffness of right shoulder joint  M25.611 719.51       3.  S/P arthroscopy of right shoulder  Z98.890 V45.89         PLAN  [x] Progress as tolerated under current plan towards long-term goals  [] Discharge  [] Other:        PT Exercise Log         Activity/Exercise Date  02/24/23      Pulleys x     Rows (green) x      ER/IR active (yellow) x     TG roll x     Table roll x     Wand flexion/ER 3lbs x     UBE x8'      Wall walks x     IR belt stretch x     S/L ER 1lbs x     Rhythmic stabilization x     Bodyblade x   PROM SHOULDER    X                                Maggie Hendrix, PT 2/24/2023  10:23 AM

## 2023-03-03 NOTE — PROGRESS NOTES
ASSESSMENT/PLAN:  Below is the assessment and plan developed based on review of pertinent history, physical exam, labs, studies, and medications. 1. Impingement syndrome of shoulder, right  -     REFERRAL TO PHYSICAL THERAPY      In discussion with the patient, we considered the numerus possible diagnoses that could be contributing to their present symptoms. We also deliberated on the extensive management options that must be considered to treat their current condition. We reviewed their accessible prior medical records, diagnostic tests, and current health and employment information. We considered how these symptoms were affecting the patient´s activities of daily living as well as employment and fitness activities. The patient had various questions regarding the possible risks, benefits, complications, morbidity and mortality regarding their diagnosis and treatment options. The patients´ comorbidities were considered, and I advocated that they consider maximizing lifestyle modification through nutrition and exercise to aid in addressing their symptoms. Shared decision making yielded an understanding to move forward with conservation treatment preferences. The patient expressed understanding that if conservative management fails to alleviate the present symptoms they will return to office for re-evaluation and consideration of additional diagnostic tests and potential surgical options. In the interim, we have recommended ice, elevation, and take prescription anti-inflammatory medications along with a physician directed home exercise program. We discussed the risks and common side effects of anti-inflammatory medications and instructed the patient to discontinue the medication and contact us if they experienced any side effects. The patient was encouraged to discuss the possible side effects with their family physician or pharmacist prior to initiating any new medications.     We discussed the fact that many of the recommended treatment options presented are significantly limited by the patient´s social determinants of health. We also reviewed the circumstances surrounding the environment that they live and work which affect a wide range of health risk. We considered the limited access to appropriate educational resources regarding proper nutrition and exercise as well as the economic and social support necessary to maintain health and wellbeing. Given that the patient's symptoms are increasing in frequency and duration we have decided to prescribe physical therapy. We talked about the fact that the goal of physical therapy is for the therapist to assist in developing a program to help return the patient to full strength, function and mobility and decrease pain. We also discussed that the therapist may combine several techniques to help decrease pain. These include but are not limited to stretching, balance exercises, strength training, massage, cold and heat therapy, and electrical stimulation. Although, physical therapy is generally safe, we went over the potential risks to include the worsening of pre-existing conditions, continued pain and no improvement in flexibility, mobility, and strength. We will have the patient follow up after physical therapy to closely monitor their progress. We talked about following up sooner if therapy is not progressing on a weekly basis. Will continue her 3 times a week and physical therapy to work on regaining her full range of motion. We stressed the importance of daily home exercises to progress her range of motion and to avoid adhesive capsulitis in the shoulder. She was given a note for work that she may return with light duty restrictions including only a 5 pound weight limit to the right upper extremity. She is written a fresh prescription for physical therapy for 3 times a week for the next 6 weeks.   We will see her back in 4 weeks time for repeat evaluation of her range of motion. We will start her on some Keflex today just to make sure she does not develop a superficial infection. SUBJECTIVE/OBJECTIVE:  Whitney Ochoa (: 1975) is a 48 y.o. female, patient,here for evaluation of the Surgical Follow-up (Post op follow up - right should sx on 2022)  . The patient returns today for follow-up of her right shoulder. She underwent right shoulder arthroscopy with acromioplasty, distal clavicle excision, and extensive debridement on 2022. She has been icing elevating. She has been out of her sling. She denies any numbness tingling erythema or warmth. She has been progressing slowly with her range of motion. She reports she does have some drainage out of the anterior portal but no erythema or warmth. No fever chills or night sweats. PHYSICAL EXAM:    Examination of the operative shoulder reveals that the incisions are healing well, without evidence of drainage, erythema or warmth. There is limited range of motion secondary to discomfort. Strength is 5/5 distally. There is no tenderness at the elbow and wrist. Sensation is intact to light touch distally and there is a brisk capillary refill. She does have a scab over the anterior superior portal.  I am unable to express any purulence or fluid today. Allergies   Allergen Reactions    Gabapentin Unknown (comments)       Current Outpatient Medications   Medication Sig    HYDROcodone-acetaminophen (NORCO) 7.5-325 mg per tablet Take 1 Tablet by mouth every eight (8) hours. Scheduled    pregabalin (LYRICA) 150 mg capsule Take 150 mg by mouth nightly. tiZANidine (ZANAFLEX) 4 mg tablet Take 4 mg by mouth two (2) times daily as needed for Muscle Spasm(s). ascorbic acid, vitamin C, (VITAMIN C) 500 mg tablet Take 500 mg by mouth daily. calcium carbonate (OS-BREANA) 500 mg calcium (1,250 mg) tablet Take 1 Tablet by mouth daily.     ALPRAZolam (XANAX) 0.25 mg tablet Take 0.25 mg by mouth nightly as needed for Anxiety. Last filled 3/9/22 #10 for 30 days supply    escitalopram oxalate (LEXAPRO) 10 mg tablet Take 10 mg by mouth daily. lidocaine (LIDODERM) 5 % Apply patch to the affected area for 12 hours a day and remove for 12 hours a day. lidocaine (XYLOCAINE) 5 % ointment Apply to affected area twice daily as needed for pain     No current facility-administered medications for this visit. 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       Family History   Problem Relation Age of Onset    Stroke Mother     Lung Disease Father     Bleeding Prob Father     Heart Disease Father     Hypertension Father     Anesth Problems Neg Hx        Social History     Socioeconomic History    Marital status: SINGLE     Spouse name: Not on file    Number of children: Not on file    Years of education: Not on file    Highest education level: Not on file   Occupational History    Not on file   Tobacco Use    Smoking status: Former     Packs/day: 0.50     Years: 10.00     Pack years: 5.00     Types: Cigars, Cigarettes    Smokeless tobacco: Current    Tobacco comments:     SMOKES CIGARS   Vaping Use    Vaping Use: Some days    Substances: Flavoring   Substance and Sexual Activity    Alcohol use: Yes     Alcohol/week: 0.0 standard drinks     Comment:  Occ    Drug use: No     Types: Prescription    Sexual activity: Never     Comment: seperated--has 3 children   Other Topics Concern    Not on file   Social History Narrative    Not on file     Social Determinants of Health     Financial Resource Strain: Not on file   Food Insecurity: Not on file   Transportation Needs: Not on file   Physical Activity: Not on file   Stress: Not on file   Social Connections: Not on file   Intimate Partner Violence: Not on file   Housing Stability: Not on file       Review of Systems    No flowsheet data found. Vitals:  Ht 5' 5\" (1.651 m)   Wt 220 lb (99.8 kg)   LMP 01/04/2012   BMI 36.61 kg/m²    Body mass index is 36.61 kg/m². ROS    Positive for: Musculoskeletal  Last edited by Doretha Hewitt on 10/3/2022 10:25 AM.            An electronic signature was used to authenticate this note.   -- Siobhan Villegas MD

## 2023-03-06 ENCOUNTER — OFFICE VISIT (OUTPATIENT)
Dept: ORTHOPEDIC SURGERY | Age: 48
End: 2023-03-06
Payer: OTHER MISCELLANEOUS

## 2023-03-06 DIAGNOSIS — M25.511 RIGHT SHOULDER PAIN, UNSPECIFIED CHRONICITY: Primary | ICD-10-CM

## 2023-03-06 DIAGNOSIS — M25.611 STIFFNESS OF RIGHT SHOULDER JOINT: ICD-10-CM

## 2023-03-06 DIAGNOSIS — Z98.890 S/P ARTHROSCOPY OF RIGHT SHOULDER: ICD-10-CM

## 2023-03-06 PROCEDURE — 97140 MANUAL THERAPY 1/> REGIONS: CPT | Performed by: PHYSICAL THERAPIST

## 2023-03-06 PROCEDURE — 97110 THERAPEUTIC EXERCISES: CPT | Performed by: PHYSICAL THERAPIST

## 2023-03-06 NOTE — PROGRESS NOTES
PT DAILY TREATMENT NOTE    Patient Name: Crow Jackson  Date:3/6/2023  : 1975  [x]  Patient  Verified  Payor: Pretty Simms / Plan: [id number] / Product Type: Workers Comp /    Total Treatment Time (min): 45  Total Timed Codes (min): 45  Referring Physician: Zachariah Judge MD     Treatment Area: Right shoulder    SUBJECTIVE:  Patient reports she was in the hospital last week Monday through Thursday due to a blockage in her stomach. No complaints about her shoulder    OBJECTIVE  Modality:   []  E-Stim: type _ x _ min     []att   []unatt   []w/US   []w/ice   []w/heat  []  Ultrasound: []cont   []pulse    _ W/cm2 x _  min   []1MHz   []3MHz  []  Ice pack _  min       []  Hot pack _  min       []  Other:     Therapeutic Exercise: (minutes: 30)  [x] see exercise log      Added/Changed Exercises:  []  Added:   []  Changed:       Manual Therapy: (minutes: 15)  Rotator cuff release, oscillations, traction. Soft tissue work to pectoralis minor. Posterior/inferior glenohumeral joint mobilization. Passive range of motion all planes to tolerance. Hold relax stretching for internal and external rotation. Patient Education: [x] Review HEP    [] Progressed/Changed HEP:      Other Objective/Functional Measures:     ASSESSMENT  []  See Plan of Care  []  See progress note/recertification  [x]  Patient will continue to benefit from skilled therapy to address remaining functional deficits: Total number of exercises held today due to recent hospitalization and instruction to keep it light with strengthening exercises. Behind the back reach is steadily improving. She is also making gradual improvements in strength, 4/5 with manual muscle testing resisted flexion and abduction, 4 -/5 ER at her side. We will continue working restore full functional mobility and progress cuff/periscapular strength      ICD-10-CM ICD-9-CM    1.  Right shoulder pain, unspecified chronicity M25.511 719.41       2. Stiffness of right shoulder joint  M25.611 719.51       3.  S/P arthroscopy of right shoulder  Z98.890 V45.89         PLAN  [x] Progress as tolerated under current plan towards long-term goals  [] Discharge  [] Other:        PT Exercise Log         Activity/Exercise Date  03/06/23      Pulleys x     Rows (green)       ER/IR active (yellow)      TG roll x     Table roll x     Wand flexion/ER 3lbs      UBE x8'      Wall walks      IR belt stretch x     S/L ER 1lbs x     Rhythmic stabilization      Bodyblade    PROM SHOULDER    X                                Clemens Perch, PT 3/6/2023  10:23 AM

## 2023-03-10 ENCOUNTER — OFFICE VISIT (OUTPATIENT)
Dept: ORTHOPEDIC SURGERY | Age: 48
End: 2023-03-10

## 2023-03-10 DIAGNOSIS — M25.611 STIFFNESS OF RIGHT SHOULDER JOINT: ICD-10-CM

## 2023-03-10 DIAGNOSIS — Z98.890 S/P ARTHROSCOPY OF RIGHT SHOULDER: ICD-10-CM

## 2023-03-10 DIAGNOSIS — M25.511 RIGHT SHOULDER PAIN, UNSPECIFIED CHRONICITY: Primary | ICD-10-CM

## 2023-03-10 NOTE — PROGRESS NOTES
PT DAILY TREATMENT NOTE    Patient Name: Ulysses Gross  Date:3/10/2023  : 1975  [x]  Patient  Verified  Payor: Tahir Munson / Plan: [id number] / Product Type: Workers Comp /    Total Treatment Time (min): 55  Total Timed Codes (min): 55  Referring Physician: Marshall Ozuna MD     Treatment Area: Right shoulder    SUBJECTIVE:  Doing well, nothing now    OBJECTIVE  Modality:   []  E-Stim: type _ x _ min     []att   []unatt   []w/US   []w/ice   []w/heat  []  Ultrasound: []cont   []pulse    _ W/cm2 x _  min   []1MHz   []3MHz  []  Ice pack _  min       []  Hot pack _  min       []  Other:     Therapeutic Exercise: (minutes: 40)  [x] see exercise log      Added/Changed Exercises:  []  Added:   []  Changed:       Manual Therapy: (minutes: 15)  Rotator cuff release, oscillations, traction. Soft tissue work to pectoralis minor. Posterior/inferior glenohumeral joint mobilization. Passive range of motion all planes to tolerance. Hold relax stretching for internal and external rotation. Patient Education: [x] Review HEP    [] Progressed/Changed HEP:      Other Objective/Functional Measures:     ASSESSMENT  []  See Plan of Care  []  See progress note/recertification  [x]  Patient will continue to benefit from skilled therapy to address remaining functional deficits:     More guarded and tight with passive motion all planes today, particularly with the ER at 90 degrees of abduction. Inferior capsule tightness remains. Follow her again next week      ICD-10-CM ICD-9-CM    1. Right shoulder pain, unspecified chronicity  M25.511 719.41       2. Stiffness of right shoulder joint  M25.611 719.51       3.  S/P arthroscopy of right shoulder  Z98.890 V45.89         PLAN  [x] Progress as tolerated under current plan towards long-term goals  [] Discharge  [] Other:        PT Exercise Log         Activity/Exercise Date  03/10/23      Pulleys x     Rows (green)       ER/IR active (yellow)      TG roll x     Table roll x     Wand flexion/ER 3lbs x     UBE x8' x     Wall walks      IR belt stretch x     S/L ER 1lbs x     Rhythmic stabilization      Bodyblade    PROM SHOULDER    X                                Miriam Rodrigues, PT 3/10/2023  10:23 AM

## 2023-03-13 ENCOUNTER — OFFICE VISIT (OUTPATIENT)
Dept: ORTHOPEDIC SURGERY | Age: 48
End: 2023-03-13
Payer: OTHER MISCELLANEOUS

## 2023-03-13 VITALS — WEIGHT: 220 LBS | BODY MASS INDEX: 36.65 KG/M2 | HEIGHT: 65 IN

## 2023-03-13 DIAGNOSIS — M25.611 STIFFNESS OF RIGHT SHOULDER JOINT: Primary | ICD-10-CM

## 2023-03-13 PROCEDURE — 99214 OFFICE O/P EST MOD 30 MIN: CPT | Performed by: ORTHOPAEDIC SURGERY

## 2023-03-13 RX ORDER — BUPRENORPHINE 7.5 UG/H
PATCH TRANSDERMAL
COMMUNITY
Start: 2022-12-31

## 2023-03-13 RX ORDER — CELECOXIB 200 MG/1
200 CAPSULE ORAL DAILY
COMMUNITY
Start: 2023-01-27

## 2023-03-13 RX ORDER — NALOXONE HYDROCHLORIDE 4 MG/.1ML
SPRAY NASAL
COMMUNITY
Start: 2022-12-27

## 2023-03-13 RX ORDER — SUCRALFATE 1 G/1
TABLET ORAL
COMMUNITY
Start: 2023-03-02

## 2023-03-13 RX ORDER — OMEPRAZOLE 40 MG/1
40 CAPSULE, DELAYED RELEASE ORAL 2 TIMES DAILY
COMMUNITY
Start: 2023-03-02

## 2023-03-13 RX ORDER — METOCLOPRAMIDE 10 MG/1
TABLET ORAL
COMMUNITY
Start: 2023-03-02

## 2023-03-13 NOTE — LETTER
3/13/2023 11:18 AM    Ms. Jodi Hernandez [id number]      NOTIFICATION RETURN TO WORK           To Whom It May Concern:    Blaire Kapoor is currently under the care of [facility]. Please allow her to return to work with no lifting greater than 5 pounds no lifting above shoulder level. If there are questions or concerns please have the patient contact our office.                                                      Sincerely,      Dianna Baca MD

## 2023-03-15 ENCOUNTER — OFFICE VISIT (OUTPATIENT)
Dept: ORTHOPEDIC SURGERY | Age: 48
End: 2023-03-15

## 2023-03-15 DIAGNOSIS — M25.511 RIGHT SHOULDER PAIN, UNSPECIFIED CHRONICITY: Primary | ICD-10-CM

## 2023-03-15 DIAGNOSIS — M25.611 STIFFNESS OF RIGHT SHOULDER JOINT: ICD-10-CM

## 2023-03-15 DIAGNOSIS — Z98.890 S/P ARTHROSCOPY OF RIGHT SHOULDER: ICD-10-CM

## 2023-03-15 NOTE — PROGRESS NOTES
PT DAILY TREATMENT NOTE    Patient Name: Timmy Duarte  Date:3/15/2023  : 1975  [x]  Patient  Verified  Payor: Silvino Black / Plan: [id number] / Product Type: Workers Comp /    Total Treatment Time (min): 55  Total Timed Codes (min): 55  Referring Physician: Blake Carl MD     Treatment Area: Right shoulder    SUBJECTIVE:  Patient states that she is having continued shoulder pain. OBJECTIVE  Modality:   []  E-Stim: type _ x _ min     []att   []unatt   []w/US   []w/ice   []w/heat  []  Ultrasound: []cont   []pulse    _ W/cm2 x _  min   []1MHz   []3MHz  []  Ice pack _  min       []  Hot pack _  min       []  Other:     Therapeutic Exercise: (minutes: 40)  [x] see exercise log      Added/Changed Exercises:  []  Added:   []  Changed:       Manual Therapy: (minutes: 15)  Rotator cuff release, oscillations, traction. Soft tissue work to pectoralis minor. Posterior/inferior glenohumeral joint mobilization. Passive range of motion all planes to tolerance. Hold relax stretching for internal and external rotation. Patient Education: [x] Review HEP    [] Progressed/Changed HEP:      Other Objective/Functional Measures:     ASSESSMENT  []  See Plan of Care  []  See progress note/recertification  [x]  Patient will continue to benefit from skilled therapy to address remaining functional deficits:     Patient does continue to present with mobility loss with the ER and reach behind back. She was educated on stretches including IR stretch with strap, doorway stretch, and supine ER stretch to address this. ICD-10-CM ICD-9-CM    1. Right shoulder pain, unspecified chronicity  M25.511 719.41       2. S/P arthroscopy of right shoulder  Z98.890 V45.89       3.  Stiffness of right shoulder joint  M25.611 719.51         PLAN  [x] Progress as tolerated under current plan towards long-term goals  [] Discharge  [] Other:        PT Exercise Log         Activity/Exercise Date  03/15/23      Pulleys x     Rows (green)       ER/IR active (yellow)      TG roll x     Table roll x     Wand flexion/ER 3lbs x     UBE x8' x     Wall walks      IR belt stretch x     S/L ER 1lbs x     Rhythmic stabilization      Bodyblade    PROM SHOULDER    X                                Kee Moss, PT 3/15/2023  10:23 AM

## 2023-03-22 ENCOUNTER — OFFICE VISIT (OUTPATIENT)
Dept: ORTHOPEDIC SURGERY | Age: 48
End: 2023-03-22

## 2023-03-22 DIAGNOSIS — M25.511 RIGHT SHOULDER PAIN, UNSPECIFIED CHRONICITY: Primary | ICD-10-CM

## 2023-03-22 DIAGNOSIS — M25.611 STIFFNESS OF RIGHT SHOULDER JOINT: ICD-10-CM

## 2023-03-22 DIAGNOSIS — Z98.890 S/P ARTHROSCOPY OF RIGHT SHOULDER: ICD-10-CM

## 2023-03-22 NOTE — PROGRESS NOTES
PT DAILY TREATMENT NOTE    Patient Name: Natasha Elder  Date:3/22/2023  : 1975  [x]  Patient  Verified  Payor: Jordon Wise / Plan: [id number] / Product Type: Workers Comp /    Total Treatment Time (min): 55  Total Timed Codes (min): 55  Referring Physician: Isabela Estrada MD     Treatment Area: Right shoulder    SUBJECTIVE:  Patient states that the front of her shoulder is still very sore. OBJECTIVE  Modality:   []  E-Stim: type _ x _ min     []att   []unatt   []w/US   []w/ice   []w/heat  []  Ultrasound: []cont   []pulse    _ W/cm2 x _  min   []1MHz   []3MHz  []  Ice pack _  min       []  Hot pack _  min       []  Other:     Therapeutic Exercise: (minutes: 40)  [x] see exercise log      Added/Changed Exercises:  []  Added:   []  Changed:       Manual Therapy: (minutes: 15)  Rotator cuff release, oscillations, traction. Soft tissue work to pectoralis minor. Posterior/inferior glenohumeral joint mobilization. Passive range of motion all planes to tolerance. Hold relax stretching for internal and external rotation. Patient Education: [x] Review HEP    [] Progressed/Changed HEP:      Other Objective/Functional Measures:     ASSESSMENT  []  See Plan of Care  []  See progress note/recertification  [x]  Patient will continue to benefit from skilled therapy to address remaining functional deficits:     Patient has mild capsular restrictions limiting endrange ER to about 80 degrees. Patient continues to have right shoulder flexor and abduction weakness. We will continue plan with focus on progressing strengthening to achieve long-term goals. ICD-10-CM ICD-9-CM    1. Right shoulder pain, unspecified chronicity  M25.511 719.41       2. S/P arthroscopy of right shoulder  Z98.890 V45.89       3.  Stiffness of right shoulder joint  M25.611 719.51         PLAN  [x] Progress as tolerated under current plan towards long-term goals  [] Discharge  [] Other: PT Exercise Log         Activity/Exercise Date  03/22/23      Pulleys x     Rows (green)       ER/IR active (yellow)      TG roll x     Table roll x     Wand flexion/ER 3lbs x     UBE x8' x     Wall walks      IR belt stretch x     S/L ER 1lbs x     Rhythmic stabilization      Bodyblade    PROM SHOULDER    X                                Ryan Issa, PT 3/22/2023  10:23 AM

## 2023-03-24 ENCOUNTER — OFFICE VISIT (OUTPATIENT)
Dept: ORTHOPEDIC SURGERY | Age: 48
End: 2023-03-24

## 2023-03-24 DIAGNOSIS — Z98.890 S/P ARTHROSCOPY OF RIGHT SHOULDER: ICD-10-CM

## 2023-03-24 DIAGNOSIS — M25.511 RIGHT SHOULDER PAIN, UNSPECIFIED CHRONICITY: Primary | ICD-10-CM

## 2023-03-24 DIAGNOSIS — M25.611 STIFFNESS OF RIGHT SHOULDER JOINT: ICD-10-CM

## 2023-03-24 NOTE — PROGRESS NOTES
PT DAILY TREATMENT NOTE    Patient Name: Norberto Hawkins  Date:3/24/2023  : 1975  [x]  Patient  Verified  Payor: Ayah Grimaldo / Plan: [id number] / Product Type: Workers Comp /    Total Treatment Time (min): 55  Total Timed Codes (min): 55  Referring Physician: Jamia Gutierrez MD     Treatment Area: Right shoulder    SUBJECTIVE:  Patient states that she continues to have significant pain on the front of her shoulder. OBJECTIVE  Modality:   []  E-Stim: type _ x _ min     []att   []unatt   []w/US   []w/ice   []w/heat  []  Ultrasound: []cont   []pulse    _ W/cm2 x _  min   []1MHz   []3MHz  []  Ice pack _  min       []  Hot pack _  min       []  Other:     Therapeutic Exercise: (minutes: 40)  [x] see exercise log      Added/Changed Exercises:  []  Added:   []  Changed:       Manual Therapy: (minutes: 15)  Rotator cuff release, oscillations, traction. Soft tissue work to pectoralis minor. Posterior/inferior glenohumeral joint mobilization. Passive range of motion all planes to tolerance. Hold relax stretching for internal and external rotation. Patient Education: [x] Review HEP    [] Progressed/Changed HEP:      Other Objective/Functional Measures:     ASSESSMENT  []  See Plan of Care  []  See progress note/recertification  [x]  Patient will continue to benefit from skilled therapy to address remaining functional deficits:     Capsular restrictions remain limiting endrange ER and IR. Patient continues to present with forward shoulder positioning likely contributing to continued anterior shoulder pain. We will continue plan with focus on progressing scapular and postural reeducation exercises to address this and reduce anterior shoulder pain. ICD-10-CM ICD-9-CM    1. Right shoulder pain, unspecified chronicity  M25.511 719.41       2. Stiffness of right shoulder joint  M25.611 719.51       3.  S/P arthroscopy of right shoulder  Z98.890 V45.89 PLAN  [x] Progress as tolerated under current plan towards long-term goals  [] Discharge  [] Other:        PT Exercise Log         Activity/Exercise Date  03/24/23      Pulleys x     Rows (green)       ER/IR active (yellow)      TG roll x     Table roll x     Wand flexion/ER 3lbs x     UBE x8' x     Wall walks      IR belt stretch x     S/L ER 1lbs x     Rhythmic stabilization      Bodyblade    PROM SHOULDER    X                                Patti Canavan, PT 3/24/2023  10:23 AM

## 2023-03-31 ENCOUNTER — OFFICE VISIT (OUTPATIENT)
Dept: ORTHOPEDIC SURGERY | Age: 48
End: 2023-03-31

## 2023-03-31 DIAGNOSIS — M25.611 STIFFNESS OF RIGHT SHOULDER JOINT: ICD-10-CM

## 2023-03-31 DIAGNOSIS — M25.511 RIGHT SHOULDER PAIN, UNSPECIFIED CHRONICITY: Primary | ICD-10-CM

## 2023-03-31 DIAGNOSIS — Z98.890 S/P ARTHROSCOPY OF RIGHT SHOULDER: ICD-10-CM

## 2023-03-31 NOTE — PROGRESS NOTES
PT DAILY TREATMENT NOTE    Patient Name: Loren Hicks  Date:3/31/2023  : 1975  [x]  Patient  Verified  Payor: Светлана Jolly / Plan: [address] / Product Type: Workers Comp /    Total Treatment Time (min): 55  Total Timed Codes (min): 55  Referring Physician: Jodi Carroll MD wheelchair      Treatment Area: Right shoulder    SUBJECTIVE:  Continues with complaints of weakness and pain over the anterior shoulder    OBJECTIVE  Modality:   []  E-Stim: type _ x _ min     []att   []unatt   []w/US   []w/ice   []w/heat  []  Ultrasound: []cont   []pulse    _ W/cm2 x _  min   []1MHz   []3MHz  []  Ice pack _  min       []  Hot pack _  min       []  Other:     Therapeutic Exercise: (minutes: 40)  [x] see exercise log      Added/Changed Exercises:  [x]  Added: Prone row 5lbs  []  Changed:       Manual Therapy: (minutes: 15)  Rotator cuff release, oscillations, traction. Soft tissue work to pectoralis minor. Posterior/inferior glenohumeral joint mobilization. Passive range of motion all planes to tolerance. Hold relax stretching for internal and external rotation. Rhythmic stabilization  Desensitization techniques to the anterior shoulder and portal sites      Patient Education: [x] Review HEP    [] Progressed/Changed HEP:      Other Objective/Functional Measures:     ASSESSMENT  []  See Plan of Care  []  See progress note/recertification  [x]  Patient will continue to benefit from skilled therapy to address remaining functional deficits:       Her primary motion loss is internal rotation. She continues to guard significantly his rotator cuff as well as primary weakness extensive review of the home exercise program was completed. I encouraged her to complete more activities at home for progressive strengthening      ICD-10-CM ICD-9-CM    1. Right shoulder pain, unspecified chronicity  M25.511 719.41       2. Stiffness of right shoulder joint  M25.611 719.51       3.  S/P arthroscopy of right shoulder  Z98.890 V45.89           PLAN  [x] Progress as tolerated under current plan towards long-term goals  [] Discharge  [] Other:        PT Exercise Log         Activity/Exercise Date  03/31/23      Pulleys x     Rows (green) x      ER/IR active (yellow) x     TG roll x     Table roll x     Wand flexion/ER x     UBE x8' x     Wall walks x     IR belt stretch x     S/L ER 1lbs x     Rhythmic stabilization x     Bodyblade x   PROM SHOULDER    X     Prone row 5lbs x                            Kim Plane, PT 3/31/2023  10:23 AM

## 2023-04-05 ENCOUNTER — OFFICE VISIT (OUTPATIENT)
Dept: ORTHOPEDIC SURGERY | Age: 48
End: 2023-04-05

## 2023-04-05 NOTE — PROGRESS NOTES
PT DAILY TREATMENT NOTE    Patient Name: Rafaela James  Date:2023  : 1975  [x]  Patient  Verified  Payor: Alexander Angulo / Plan: [id number] / Product Type: Workers Comp /    Total Treatment Time (min): 55  Total Timed Codes (min): 55  Referring Physician: Kathrin Cr MD       Treatment Area: Right shoulder    SUBJECTIVE:  Nothing new reported    OBJECTIVE  Modality:   []  E-Stim: type _ x _ min     []att   []unatt   []w/US   []w/ice   []w/heat  []  Ultrasound: []cont   []pulse    _ W/cm2 x _  min   []1MHz   []3MHz  []  Ice pack _  min       []  Hot pack _  min       []  Other:     Therapeutic Exercise: (minutes: 40)  [x] see exercise log      Added/Changed Exercises:  []  Added:   []  Changed:       Manual Therapy: (minutes: 15)  Rotator cuff release, oscillations, traction. Soft tissue work to pectoralis minor. Posterior/inferior glenohumeral joint mobilization. Passive range of motion all planes to tolerance. Hold relax stretching for internal and external rotation. Rhythmic stabilization  Desensitization techniques to the anterior shoulder and portal sites      Patient Education: [x] Review HEP    [] Progressed/Changed HEP:      Other Objective/Functional Measures:     ASSESSMENT  []  See Plan of Care  []  See progress note/recertification  [x]  Patient will continue to benefit from skilled therapy to address remaining functional deficits:       Patient remains most limited with internal rotation and is still fairly guarded with endrange stretching in all planes. She reports compliance with home exercises. Follow again end of the week      ICD-10-CM ICD-9-CM    1. Right shoulder pain, unspecified chronicity  M25.511 719.41       2. Stiffness of right shoulder joint  M25.611 719.51       3.  S/P arthroscopy of right shoulder  Z98.890 V45.89           PLAN  [x] Progress as tolerated under current plan towards long-term goals  [] Discharge  [] Other: PT Exercise Log         Activity/Exercise Date  04/05/23      Pulleys x     Rows (green) x      ER/IR active (yellow) x     TG roll x     Table roll x     Wand flexion/ER x     UBE x8' x     Wall walks x     IR belt stretch x     S/L ER 1lbs x     Rhythmic stabilization x     Bodyblade x   PROM SHOULDER    X     Prone row 5lbs x                            Melanie Smith, PT 4/5/2023  10:23 AM

## 2023-04-14 ENCOUNTER — OFFICE VISIT (OUTPATIENT)
Dept: ORTHOPEDIC SURGERY | Age: 48
End: 2023-04-14

## 2023-04-14 DIAGNOSIS — M25.611 STIFFNESS OF RIGHT SHOULDER JOINT: ICD-10-CM

## 2023-04-14 DIAGNOSIS — M75.41 IMPINGEMENT SYNDROME OF SHOULDER, RIGHT: ICD-10-CM

## 2023-04-14 DIAGNOSIS — Z98.890 S/P ARTHROSCOPY OF RIGHT SHOULDER: ICD-10-CM

## 2023-04-14 DIAGNOSIS — M25.511 RIGHT SHOULDER PAIN, UNSPECIFIED CHRONICITY: Primary | ICD-10-CM

## 2023-04-21 ENCOUNTER — OFFICE VISIT (OUTPATIENT)
Dept: ORTHOPEDIC SURGERY | Age: 48
End: 2023-04-21

## 2023-04-21 DIAGNOSIS — M25.511 RIGHT SHOULDER PAIN, UNSPECIFIED CHRONICITY: Primary | ICD-10-CM

## 2023-04-21 DIAGNOSIS — Z98.890 S/P ARTHROSCOPY OF RIGHT SHOULDER: ICD-10-CM

## 2023-04-21 DIAGNOSIS — M25.611 STIFFNESS OF RIGHT SHOULDER JOINT: ICD-10-CM

## 2023-04-21 NOTE — PROGRESS NOTES
PT DAILY TREATMENT NOTE    Patient Name: Jian Zamudio  Date:2023  : 1975  [x]  Patient  Verified  Payor: Jeffrey Torre / Plan: [id number] / Product Type: Workers Comp /    Total Treatment Time (min): 50  Total Timed Codes (min): 50  Referring Physician: Gretchen Pham MD       Treatment Area: Right shoulder    SUBJECTIVE:  Patient has persisting anterior shoulder pain. OBJECTIVE  Modality:   []  E-Stim: type _ x _ min     []att   []unatt   []w/US   []w/ice   []w/heat  []  Ultrasound: []cont   []pulse    _ W/cm2 x _  min   []1MHz   []3MHz  []  Ice pack _  min       []  Hot pack _  min       []  Other:     Therapeutic Exercise: (minutes: 35)  [x] see exercise log      Added/Changed Exercises:  []  Added:   []  Changed:       Manual Therapy: (minutes: 15)  Rotator cuff release, oscillations, traction. Posterior/inferior glenohumeral joint mobilization. Passive range of motion all planes to tolerance. Hold relax stretching for internal and external rotation. Patient Education: [x] Review HEP    [] Progressed/Changed HEP:      Other Objective/Functional Measures:     ASSESSMENT  []  See Plan of Care  []  See progress note/recertification  [x]  Patient will continue to benefit from skilled therapy to address remaining functional deficits:     Patient is able to demonstrate near endrange active ROM in all planes. Patient's primary complaint is continued anterior shoulder pain. This is secondary to continued scapular weakness and pectoralis restrictions. Scapular strengthening exercises added today to address this. Patient's primary limitation is strength. ICD-10-CM ICD-9-CM    1. Right shoulder pain, unspecified chronicity  M25.511 719.41       2. Stiffness of right shoulder joint  M25.611 719.51       3.  S/P arthroscopy of right shoulder  Z98.890 V45.89         PLAN  [x] Progress as tolerated under current plan towards long-term goals  [] Discharge  [] Other:        PT Exercise Log         Activity/Exercise Date  04/21/23      Pulleys x     Rows (green) x      ER/IR active (yellow) x     TG roll x     Table roll x     Wand flexion/ER x     UBE x8' x     Wall walks x     IR belt stretch x     S/L ER 1lbs x     Rhythmic stabilization x     Bodyblade x   PROM SHOULDER    X     Prone row 5lbs x                            Jusitn Luann, PT 4/21/2023  10:23 AM

## 2023-04-24 ENCOUNTER — OFFICE VISIT (OUTPATIENT)
Dept: ORTHOPEDIC SURGERY | Age: 48
End: 2023-04-24
Payer: OTHER MISCELLANEOUS

## 2023-04-24 VITALS — BODY MASS INDEX: 36.65 KG/M2 | HEIGHT: 65 IN | WEIGHT: 220 LBS

## 2023-04-24 DIAGNOSIS — M75.41 IMPINGEMENT SYNDROME OF SHOULDER, RIGHT: Primary | ICD-10-CM

## 2023-04-24 PROCEDURE — 99214 OFFICE O/P EST MOD 30 MIN: CPT | Performed by: ORTHOPAEDIC SURGERY
